# Patient Record
Sex: FEMALE | Race: WHITE | NOT HISPANIC OR LATINO | Employment: OTHER | URBAN - METROPOLITAN AREA
[De-identification: names, ages, dates, MRNs, and addresses within clinical notes are randomized per-mention and may not be internally consistent; named-entity substitution may affect disease eponyms.]

---

## 2017-04-14 ENCOUNTER — OFFICE VISIT (OUTPATIENT)
Dept: ENDOCRINOLOGY | Facility: CLINIC | Age: 32
End: 2017-04-14

## 2017-04-14 VITALS
DIASTOLIC BLOOD PRESSURE: 74 MMHG | HEIGHT: 67 IN | SYSTOLIC BLOOD PRESSURE: 120 MMHG | BODY MASS INDEX: 25.55 KG/M2 | HEART RATE: 68 BPM | OXYGEN SATURATION: 98 % | WEIGHT: 162.8 LBS

## 2017-04-14 DIAGNOSIS — E05.90 HYPERTHYROIDISM: Primary | ICD-10-CM

## 2017-04-14 LAB
25(OH)D3 SERPL-MCNC: 47 NG/ML
T4 FREE SERPL-MCNC: 1.15 NG/DL (ref 0.89–1.76)
TSH SERPL DL<=0.05 MIU/L-ACNC: 1.96 MIU/ML (ref 0.35–5.35)

## 2017-04-14 PROCEDURE — 82306 VITAMIN D 25 HYDROXY: CPT | Performed by: INTERNAL MEDICINE

## 2017-04-14 PROCEDURE — 84439 ASSAY OF FREE THYROXINE: CPT | Performed by: INTERNAL MEDICINE

## 2017-04-14 PROCEDURE — 84443 ASSAY THYROID STIM HORMONE: CPT | Performed by: INTERNAL MEDICINE

## 2017-04-14 PROCEDURE — 99213 OFFICE O/P EST LOW 20 MIN: CPT | Performed by: INTERNAL MEDICINE

## 2017-04-14 RX ORDER — ERGOCALCIFEROL 1.25 MG/1
50000 CAPSULE ORAL WEEKLY
Qty: 5 CAPSULE | Refills: 11 | Status: SHIPPED | OUTPATIENT
Start: 2017-04-14 | End: 2017-05-31 | Stop reason: SDUPTHER

## 2017-04-14 NOTE — PROGRESS NOTES
"Chief complaint  Hyperthyroidism (F/u for hyperthyroidism, C/o feeling tired but stated that she thinks it may be from forgetting her Vitamin D last week. )    Subjective   Jazlny Mota is a 31 y.o. female is here today for follow-up.  Hyperthyroidism: The patient is being seen for follow-up of hyperthyroidism. Disease type: undetermined and sx started in august, progressively getting worse. Current treatment includes propylthiouracil and started Oct 2015, no side effets or reactions.   She has 2 children, doesn't use birth control. Uses family planning method.   Mother has a history of Graves, treated with ACRRERA. She had multiple symptoms and problems with management of thyroid disease after I-131 therapy.   Palpitation and insomnia improved. Headaches resolved. Still feels tired.   Sx improved after Vit D start and feels when skips the dose tiredness worsen.   Hyperthyroidism       12/2016 Vit d was low and I have started 50 000 IU weekly Vit D  Medications    Current Outpatient Prescriptions:   •  propylthiouracil (PTU) 50 MG tablet, Take 1 tablet by mouth 3 (Three) Times a Day., Disp: 90 tablet, Rfl: 6  •  vitamin D (ERGOCALCIFEROL) 07318 UNITS capsule capsule, Take 1 capsule by mouth 1 (One) Time Per Week., Disp: 15 capsule, Rfl: 1    PMH  The following portions of the patient's history were reviewed and updated as appropriate: allergies, current medications, past family history, past medical history, past social history, past surgical history and problem list.    Review of systems  Review of Systems   Cardiovascular: Positive for palpitations.   Endocrine: Positive for heat intolerance.        Increased sweating   Genitourinary: Positive for menstrual problem.   Psychiatric/Behavioral: Positive for decreased concentration.   All other systems reviewed and are negative.      Physical exam  Objective   Blood pressure 120/74, pulse 68, height 67\" (170.2 cm), weight 162 lb 12.8 oz (73.8 kg), SpO2 98 %.   Physical " Exam   Constitutional: She is oriented to person, place, and time. She appears well-developed and well-nourished.   HENT:   Head: Normocephalic and atraumatic.   Eyes: Conjunctivae and EOM are normal.   Neck: Normal range of motion. Neck supple. Thyromegaly (right lobe is prominent, no nodules) present.   The neck is supple and free of adenopathy or masses, the thyroid is normal without enlargement or nodules.   Cardiovascular: Normal rate, regular rhythm and normal heart sounds.    Pulmonary/Chest: Effort normal and breath sounds normal.   Musculoskeletal: Normal range of motion. She exhibits no edema, tenderness or deformity.   Lymphadenopathy:     She has no cervical adenopathy.   Neurological: She is alert and oriented to person, place, and time.   No tremors   Skin: Skin is warm and dry. No rash noted.   Psychiatric: She has a normal mood and affect.   Nursing note and vitals reviewed.        LABS AND IMAGING  No visits with results within 1 Month(s) from this visit.  Latest known visit with results is:    Office Visit on 12/14/2016   Component Date Value Ref Range Status   • TSH 12/14/2016 1.643  0.350 - 5.350 mIU/mL Final   • Free T4 12/14/2016 1.11  0.89 - 1.76 ng/dL Final   • Glucose 12/14/2016 84  70 - 100 mg/dL Final   • BUN 12/14/2016 11  9 - 23 mg/dL Final   • Creatinine 12/14/2016 0.80  0.60 - 1.30 mg/dL Final   • Sodium 12/14/2016 141  132 - 146 mmol/L Final   • Potassium 12/14/2016 4.2  3.5 - 5.5 mmol/L Final   • Chloride 12/14/2016 107  99 - 109 mmol/L Final   • CO2 12/14/2016 23.0  20.0 - 31.0 mmol/L Final   • Calcium 12/14/2016 9.8  8.7 - 10.4 mg/dL Final   • Total Protein 12/14/2016 7.6  5.7 - 8.2 g/dL Final   • Albumin 12/14/2016 4.40  3.20 - 4.80 g/dL Final   • ALT (SGPT) 12/14/2016 18  7 - 40 U/L Final   • AST (SGOT) 12/14/2016 19  0 - 33 U/L Final   • Alkaline Phosphatase 12/14/2016 86  25 - 100 U/L Final   • Total Bilirubin 12/14/2016 0.4  0.3 - 1.2 mg/dL Final   • eGFR Non African Amer  12/14/2016 83  >60 mL/min/1.73 Final   • Globulin 12/14/2016 3.2  gm/dL Final   • A/G Ratio 12/14/2016 1.4  g/dL Final   • BUN/Creatinine Ratio 12/14/2016 13.8  7.0 - 25.0 Final   • Anion Gap 12/14/2016 11.0  3.0 - 11.0 mmol/L Final   • Iron 12/14/2016 76  50 - 175 mcg/dL Final   • 25 Hydroxy, Vitamin D 12/14/2016 15.4  ng/ml Final   • WBC 12/14/2016 6.62  3.50 - 10.80 10*3/mm3 Final   • RBC 12/14/2016 4.28  3.89 - 5.14 10*6/mm3 Final   • Hemoglobin 12/14/2016 12.9  11.5 - 15.5 g/dL Final   • Hematocrit 12/14/2016 38.5  34.5 - 44.0 % Final   • MCV 12/14/2016 90.0  80.0 - 99.0 fL Final   • MCH 12/14/2016 30.1  27.0 - 31.0 pg Final   • MCHC 12/14/2016 33.5  32.0 - 36.0 g/dL Final   • RDW 12/14/2016 13.3  11.3 - 14.5 % Final   • RDW-SD 12/14/2016 43.4  37.0 - 54.0 fl Final   • MPV 12/14/2016 10.6  6.0 - 12.0 fL Final   • Platelets 12/14/2016 286  150 - 450 10*3/mm3 Final   • Neutrophil % 12/14/2016 62.6  41.0 - 71.0 % Final   • Lymphocyte % 12/14/2016 29.3  24.0 - 44.0 % Final   • Monocyte % 12/14/2016 6.0  0.0 - 12.0 % Final   • Eosinophil % 12/14/2016 1.4  0.0 - 3.0 % Final   • Basophil % 12/14/2016 0.5  0.0 - 1.0 % Final   • Immature Grans % 12/14/2016 0.2  0.0 - 0.6 % Final   • Neutrophils, Absolute 12/14/2016 4.15  1.50 - 8.30 10*3/mm3 Final   • Lymphocytes, Absolute 12/14/2016 1.94  0.60 - 4.80 10*3/mm3 Final   • Monocytes, Absolute 12/14/2016 0.40  0.00 - 1.00 10*3/mm3 Final   • Eosinophils, Absolute 12/14/2016 0.09* 0.10 - 0.30 10*3/mm3 Final   • Basophils, Absolute 12/14/2016 0.03  0.00 - 0.20 10*3/mm3 Final   • Immature Grans, Absolute 12/14/2016 0.01  0.00 - 0.03 10*3/mm3 Final           Assessment  Problem List Items Addressed This Visit        Endocrine    Hyperthyroidism - Primary      Plan  Continue PTU 75 mg BID>  Repeat labs for dose adjustments.may reduce the dose to 1 tab BID if labs allow.     Follow-up in 4 months.

## 2017-04-18 ENCOUNTER — TELEPHONE (OUTPATIENT)
Dept: ENDOCRINOLOGY | Facility: CLINIC | Age: 32
End: 2017-04-18

## 2017-04-18 DIAGNOSIS — E05.90 HYPERTHYROIDISM: Primary | ICD-10-CM

## 2017-04-18 NOTE — TELEPHONE ENCOUNTER
----- Message from Evelyn CHESTER MD sent at 4/17/2017 12:01 PM EDT -----  Please call the patient regarding her lab results. THyroid function is normal, you may try to reduce PTU dose to 50 mg twice a day (1 tab twice a day) with repeat labs i n 1 -2 months. Vitamin D is normal. Place the orders for free T4 and TSH and send to patient if she doesn't have orders for repeat TFT

## 2017-04-18 NOTE — TELEPHONE ENCOUNTER
----- Message from Bertha Lopez sent at 4/18/2017 11:05 AM EDT -----  Contact: PATIENT   PATIENT RETURNED YOUR CALL

## 2017-06-01 RX ORDER — ERGOCALCIFEROL 1.25 MG/1
50000 CAPSULE ORAL WEEKLY
Qty: 5 CAPSULE | Refills: 11 | Status: SHIPPED | OUTPATIENT
Start: 2017-06-01 | End: 2017-12-01 | Stop reason: SDUPTHER

## 2017-07-10 ENCOUNTER — TELEPHONE (OUTPATIENT)
Dept: INTERNAL MEDICINE | Facility: CLINIC | Age: 32
End: 2017-07-10

## 2017-07-10 NOTE — TELEPHONE ENCOUNTER
Spoke to patient. Have asked her to hold the PTU until Friday (07/14/2017). Then restart at 50 mg daily. Pt to call me on Monday 7/17/2017 to let me know if rash resolves off med or recurs.   Rosa M Loco MD

## 2017-07-10 NOTE — TELEPHONE ENCOUNTER
C/o itching, she thinks this may be a side effect of a medication (PTU) she has been taking. She has been having this itching for about 1 month. Will have recent lab results faxed to this office.    Call back #[phone]

## 2017-07-14 ENCOUNTER — TELEPHONE (OUTPATIENT)
Dept: ENDOCRINOLOGY | Facility: CLINIC | Age: 32
End: 2017-07-14

## 2017-07-14 NOTE — TELEPHONE ENCOUNTER
----- Message from Evelyn CHESTER MD sent at 7/14/2017  8:03 AM EDT -----  PLease call with results of the labs: THyroid function was normal June 14 after dose reduction.  PLease ask her if she still has rash. She supposed to restart the medication at the lower dose of 50 mg daily today and have repeat labs.

## 2017-07-18 NOTE — TELEPHONE ENCOUNTER
Pt was informed of results and expressed understanding regarding her Rx. Lab order was entered into pt's chart and mailed to pt per pt request.   No

## 2017-07-19 ENCOUNTER — TELEPHONE (OUTPATIENT)
Dept: INTERNAL MEDICINE | Facility: CLINIC | Age: 32
End: 2017-07-19

## 2017-07-19 NOTE — TELEPHONE ENCOUNTER
Spoke with pt and she said that her itching has resolved once she starting taking the PTU once daily. Per Dr. Beal, pt was instructed to continue the PTU once daily and then we can recheck her levels in a couple of weeks.

## 2017-07-19 NOTE — TELEPHONE ENCOUNTER
PT CALLED IN REGARDS TO HER ITCHING. SHE STATES THAT A DOCTOR ON CALL FROM OUR OFFICE CALLED HER AND WANTED TO KNOW IF IT WAS THE MEDICATION THAT MADE HER ITCH. THE PATIENT WOULD LIKE FOR YOU TO GIVE HER A CALL BACK IN REGARDS TO THIS       PT CALL BACK NUMBER [phone]

## 2017-08-18 ENCOUNTER — OFFICE VISIT (OUTPATIENT)
Dept: ENDOCRINOLOGY | Facility: CLINIC | Age: 32
End: 2017-08-18

## 2017-08-18 ENCOUNTER — TELEPHONE (OUTPATIENT)
Dept: INTERNAL MEDICINE | Facility: CLINIC | Age: 32
End: 2017-08-18

## 2017-08-18 VITALS
BODY MASS INDEX: 26.06 KG/M2 | HEIGHT: 67 IN | HEART RATE: 78 BPM | WEIGHT: 166 LBS | OXYGEN SATURATION: 98 % | DIASTOLIC BLOOD PRESSURE: 80 MMHG | SYSTOLIC BLOOD PRESSURE: 118 MMHG

## 2017-08-18 DIAGNOSIS — E05.90 HYPERTHYROIDISM: Primary | ICD-10-CM

## 2017-08-18 DIAGNOSIS — E55.9 VITAMIN D DEFICIENCY: ICD-10-CM

## 2017-08-18 LAB
25(OH)D3 SERPL-MCNC: 43.5 NG/ML
T4 FREE SERPL-MCNC: 1.2 NG/DL (ref 0.89–1.76)
TSH SERPL DL<=0.05 MIU/L-ACNC: 1.43 MIU/ML (ref 0.35–5.35)

## 2017-08-18 PROCEDURE — 99213 OFFICE O/P EST LOW 20 MIN: CPT | Performed by: INTERNAL MEDICINE

## 2017-08-18 PROCEDURE — 84443 ASSAY THYROID STIM HORMONE: CPT | Performed by: INTERNAL MEDICINE

## 2017-08-18 PROCEDURE — 82306 VITAMIN D 25 HYDROXY: CPT | Performed by: INTERNAL MEDICINE

## 2017-08-18 PROCEDURE — 84439 ASSAY OF FREE THYROXINE: CPT | Performed by: INTERNAL MEDICINE

## 2017-08-18 NOTE — PROGRESS NOTES
Chief complaint  Hyperthyroidism (F/u for hyperthyroidism, C/o feeling tired ~ has not taken vitamin d this month. )    Subjective   Jazlyn Mota is a 32 y.o. female is here today for follow-up.  Hyperthyroidism: sx started in august, progressively getting worse. Current treatment includes propylthiouracil since Oct 2015, no side effets or reactions.    She has 2 children, doesn't use birth control. Uses family planning method.   Mother has a history of Graves, treated with CARRERA. She had multiple symptoms and problems with management of thyroid disease after I-131 therapy.   Palpitation and insomnia improved. Headaches resolved. Still feels tired.   Sx improved after Vit D start and feels when skips the dose tiredness worsen.   July 2017 patient had reaction to PTU -itching and rash. SHe described it as deep rash- lasted for about a month. She stopped PTU, felt awful, then restarted later at a lower dose to 50 mg daily and feels better.     She c/o fatigue, but didn't take Vit D for the last month. Rash and itching resolved after dose reduction. She feels that medication effect wears off and she has palpitations in the morning, occasional palpitations during the day.    Hyperthyroidism       12/2016 Vit d was low and I have started 50 000 IU weekly Vit D  Medications    Current Outpatient Prescriptions:   •  propylthiouracil (PTU) 50 MG tablet, Take 1 tablet by mouth 3 (Three) Times a Day., Disp: 90 tablet, Rfl: 6  •  vitamin D (ERGOCALCIFEROL) 99852 UNITS capsule capsule, Take 1 capsule by mouth 1 (One) Time Per Week., Disp: 5 capsule, Rfl: 11    PMH  The following portions of the patient's history were reviewed and updated as appropriate: allergies, current medications, past family history, past medical history, past social history, past surgical history and problem list.    Review of systems  Review of Systems   Cardiovascular: Positive for palpitations.   Endocrine: Positive for heat intolerance.         "Increased sweating   Genitourinary: Positive for menstrual problem.   Psychiatric/Behavioral: Positive for decreased concentration.   All other systems reviewed and are negative.      Physical exam  Objective   Blood pressure 118/80, pulse 78, height 67\" (170.2 cm), weight 166 lb (75.3 kg), SpO2 98 %.   Physical Exam   Constitutional: She is oriented to person, place, and time. She appears well-developed and well-nourished.   HENT:   Head: Normocephalic and atraumatic.   Eyes: Conjunctivae and EOM are normal.   Neck: Normal range of motion. Neck supple. Thyromegaly (right lobe is prominent, no nodules) present.   The neck is supple and free of adenopathy or masses, the thyroid is normal without enlargement or nodules.   Cardiovascular: Normal rate, regular rhythm and normal heart sounds.    Pulmonary/Chest: Effort normal and breath sounds normal.   Musculoskeletal: Normal range of motion. She exhibits no edema, tenderness or deformity.   Lymphadenopathy:     She has no cervical adenopathy.   Neurological: She is alert and oriented to person, place, and time.   No tremors   Skin: Skin is warm and dry. No rash noted.   Psychiatric: She has a normal mood and affect.   Nursing note and vitals reviewed.        LABS AND IMAGING  No visits with results within 1 Month(s) from this visit.  Latest known visit with results is:    Office Visit on 04/14/2017   Component Date Value Ref Range Status   • TSH 04/14/2017 1.964  0.350 - 5.350 mIU/mL Final   • Free T4 04/14/2017 1.15  0.89 - 1.76 ng/dL Final   • 25 Hydroxy, Vitamin D 04/14/2017 47.0  ng/ml Final           Assessment  Problem List Items Addressed This Visit        Digestive    Vitamin D deficiency       Endocrine    Hyperthyroidism - Primary      Plan  Continue PTU 25 mg BID >  Repeat labs for dose adjustments.may reduce the dose further  Cont Vit D     Follow-up in 4 months.   "

## 2017-08-18 NOTE — TELEPHONE ENCOUNTER
MELINDA,    THIS PATIENT NEEDS TO SCHEDULE A 3 MONTH FOLLOW UP AROUND 11/18/2017 PER ALONSO. NO EARLY APPTS AS PATIENT LIVES 2 HOURS FROM OFFICE.

## 2017-08-30 ENCOUNTER — TELEPHONE (OUTPATIENT)
Dept: ENDOCRINOLOGY | Facility: CLINIC | Age: 32
End: 2017-08-30

## 2017-08-30 NOTE — TELEPHONE ENCOUNTER
----- Message from Evelyn CHESTER MD sent at 8/29/2017 10:49 PM EDT -----  Please call the patient regarding her lab results. Thyroid function and vit D are normal. Cont same dose medication

## 2017-12-01 ENCOUNTER — OFFICE VISIT (OUTPATIENT)
Dept: ENDOCRINOLOGY | Facility: CLINIC | Age: 32
End: 2017-12-01

## 2017-12-01 VITALS
HEIGHT: 67 IN | BODY MASS INDEX: 25.83 KG/M2 | WEIGHT: 164.6 LBS | OXYGEN SATURATION: 98 % | SYSTOLIC BLOOD PRESSURE: 116 MMHG | HEART RATE: 68 BPM | DIASTOLIC BLOOD PRESSURE: 80 MMHG

## 2017-12-01 DIAGNOSIS — E05.90 HYPERTHYROIDISM: Primary | ICD-10-CM

## 2017-12-01 DIAGNOSIS — E55.9 VITAMIN D DEFICIENCY: ICD-10-CM

## 2017-12-01 LAB
T4 FREE SERPL-MCNC: 1.32 NG/DL (ref 0.89–1.76)
TSH SERPL DL<=0.05 MIU/L-ACNC: 1.18 MIU/ML (ref 0.35–5.35)

## 2017-12-01 PROCEDURE — 90471 IMMUNIZATION ADMIN: CPT | Performed by: INTERNAL MEDICINE

## 2017-12-01 PROCEDURE — 99213 OFFICE O/P EST LOW 20 MIN: CPT | Performed by: INTERNAL MEDICINE

## 2017-12-01 PROCEDURE — 84439 ASSAY OF FREE THYROXINE: CPT | Performed by: INTERNAL MEDICINE

## 2017-12-01 PROCEDURE — 90686 IIV4 VACC NO PRSV 0.5 ML IM: CPT | Performed by: INTERNAL MEDICINE

## 2017-12-01 PROCEDURE — 84443 ASSAY THYROID STIM HORMONE: CPT | Performed by: INTERNAL MEDICINE

## 2017-12-01 RX ORDER — ERGOCALCIFEROL 1.25 MG/1
50000 CAPSULE ORAL WEEKLY
Qty: 5 CAPSULE | Refills: 5 | Status: SHIPPED | OUTPATIENT
Start: 2017-12-01 | End: 2018-08-16 | Stop reason: SDUPTHER

## 2017-12-01 NOTE — PROGRESS NOTES
Chief complaint  Hyperthyroidism (F/u for hyperthyroidism, no recent labs.)    Subjective   Jazlyn Mota is a 32 y.o. female is here today for follow-up.  Hyperthyroidism: sx started in august, progressively getting worse. Current treatment includes propylthiouracil since Oct 2015, no side effets or reactions.    She has 2 children, doesn't use birth control. Uses family planning method.   Mother has a history of Graves, treated with CARRERA. She had multiple symptoms and problems with management of thyroid disease after I-131 therapy.   Palpitation and insomnia improved. Headaches resolved. Still feels tired.   Sx improved after Vit D start and feels when skips the dose tiredness worsen.   July 2017 patient had reaction to PTU -itching and rash. SHe described it as deep rash- lasted for about a month. She stopped PTU, felt awful, then restarted later at a lower dose to 50 mg daily and feels better.         C/o fatigue and irritability. NO palpitations. Takes Vit D once a week.     Hyperthyroidism       12/2016 Vit d was low and I have started 50 000 IU weekly Vit D  Medications    Current Outpatient Prescriptions:   •  propylthiouracil (PTU) 50 MG tablet, Take 1 tablet by mouth 3 (Three) Times a Day., Disp: 90 tablet, Rfl: 6  •  vitamin D (ERGOCALCIFEROL) 18518 units capsule capsule, Take 1 capsule by mouth 1 (One) Time Per Week., Disp: 5 capsule, Rfl: 5    PMH  The following portions of the patient's history were reviewed and updated as appropriate: allergies, current medications, past family history, past medical history, past social history, past surgical history and problem list.    Review of systems  Review of Systems   Cardiovascular: Positive for palpitations.   Endocrine: Positive for heat intolerance.        Increased sweating   Genitourinary: Positive for menstrual problem.   Psychiatric/Behavioral: Positive for decreased concentration.   All other systems reviewed and are negative.      Physical  "exam  Objective   Blood pressure 116/80, pulse 68, height 67\" (170.2 cm), weight 164 lb 9.6 oz (74.7 kg), SpO2 98 %.   Physical Exam   Constitutional: She is oriented to person, place, and time. She appears well-developed and well-nourished.   HENT:   Head: Normocephalic and atraumatic.   Eyes: Conjunctivae and EOM are normal.   Neck: Normal range of motion. Neck supple. Thyromegaly (right lobe is prominent, no nodules) present.   The neck is supple and free of adenopathy or masses, the thyroid is normal without enlargement or nodules.   Cardiovascular: Normal rate, regular rhythm and normal heart sounds.    Pulmonary/Chest: Effort normal and breath sounds normal.   Musculoskeletal: Normal range of motion. She exhibits no edema, tenderness or deformity.   Lymphadenopathy:     She has no cervical adenopathy.   Neurological: She is alert and oriented to person, place, and time.   No tremors   Skin: Skin is warm and dry. No rash noted.   Psychiatric: She has a normal mood and affect.   Nursing note and vitals reviewed.        LABS AND IMAGING  No visits with results within 1 Month(s) from this visit.  Latest known visit with results is:    Office Visit on 08/18/2017   Component Date Value Ref Range Status   • TSH 08/18/2017 1.429  0.350 - 5.350 mIU/mL Final   • Free T4 08/18/2017 1.20  0.89 - 1.76 ng/dL Final   • 25 Hydroxy, Vitamin D 08/18/2017 43.5  ng/ml Final           Assessment  Problem List Items Addressed This Visit        Digestive    Vitamin D deficiency       Endocrine    Hyperthyroidism - Primary      Plan  Continue PTU 25 mg BID >  Repeat labs for dose adjustments.may reduce the dose further  Cont Vit D once a week.     Follow-up in 4 months.   "

## 2018-01-10 DIAGNOSIS — E05.90 HYPERTHYROIDISM: ICD-10-CM

## 2018-01-10 RX ORDER — PROPYLTHIOURACIL 50 MG/1
TABLET ORAL
Qty: 90 TABLET | Refills: 4 | Status: SHIPPED | OUTPATIENT
Start: 2018-01-10 | End: 2018-01-10 | Stop reason: SDUPTHER

## 2018-01-10 RX ORDER — PROPYLTHIOURACIL 50 MG/1
50 TABLET ORAL 3 TIMES DAILY
Qty: 90 TABLET | Refills: 4 | Status: SHIPPED | OUTPATIENT
Start: 2018-01-10 | End: 2018-08-16 | Stop reason: SDUPTHER

## 2018-04-19 ENCOUNTER — OFFICE VISIT (OUTPATIENT)
Dept: ENDOCRINOLOGY | Facility: CLINIC | Age: 33
End: 2018-04-19

## 2018-04-19 VITALS
HEART RATE: 72 BPM | DIASTOLIC BLOOD PRESSURE: 72 MMHG | BODY MASS INDEX: 26.43 KG/M2 | OXYGEN SATURATION: 98 % | HEIGHT: 67 IN | SYSTOLIC BLOOD PRESSURE: 118 MMHG | WEIGHT: 168.4 LBS

## 2018-04-19 DIAGNOSIS — E55.9 VITAMIN D DEFICIENCY: Primary | ICD-10-CM

## 2018-04-19 DIAGNOSIS — E05.90 HYPERTHYROIDISM: ICD-10-CM

## 2018-04-19 LAB
25(OH)D3 SERPL-MCNC: 53 NG/ML
T4 FREE SERPL-MCNC: 1.19 NG/DL (ref 0.89–1.76)
TSH SERPL DL<=0.05 MIU/L-ACNC: 1.51 MIU/ML (ref 0.35–5.35)

## 2018-04-19 PROCEDURE — 82306 VITAMIN D 25 HYDROXY: CPT | Performed by: INTERNAL MEDICINE

## 2018-04-19 PROCEDURE — 84439 ASSAY OF FREE THYROXINE: CPT | Performed by: INTERNAL MEDICINE

## 2018-04-19 PROCEDURE — 99213 OFFICE O/P EST LOW 20 MIN: CPT | Performed by: INTERNAL MEDICINE

## 2018-04-19 PROCEDURE — 84443 ASSAY THYROID STIM HORMONE: CPT | Performed by: INTERNAL MEDICINE

## 2018-04-19 NOTE — PROGRESS NOTES
Chief complaint  Hyperthyroidism (F/u for hyperthyroidism, pt stated she has been feeling well overall. No recent labs)    Subjective   Jazlyn Mota is a 32 y.o. female is here today for follow-up.  Hyperthyroidism: sx started in august, progressively getting worse. Current treatment includes propylthiouracil since Oct 2015, no side effets or reactions.    She has 2 children, doesn't use birth control. Uses family planning method.   Mother has a history of Graves, treated with CARRERA. She had multiple symptoms and problems with management of thyroid disease after I-131 therapy.   Palpitation and insomnia improved. Headaches resolved. Still feels tired.   Sx improved after Vit D start and feels when skips the dose tiredness worsen.   July 2017 patient had reaction to PTU -itching and rash. SHe described it as deep rash- lasted for about a month. She stopped PTU, felt awful, then restarted later at a lower dose to 50 mg daily and feels better.   Currently takes 25 mg BID.     C/o fatigue and irritability. NO palpitations. Takes Vit D once a week.     Hyperthyroidism       12/2016 Vit d was low and I have started 50 000 IU weekly Vit D  Medications    Current Outpatient Prescriptions:   •  propylthiouracil (PTU) 50 MG tablet, Take 1 tablet by mouth 3 (Three) Times a Day., Disp: 90 tablet, Rfl: 4  •  vitamin D (ERGOCALCIFEROL) 94525 units capsule capsule, Take 1 capsule by mouth 1 (One) Time Per Week., Disp: 5 capsule, Rfl: 5    PMH  The following portions of the patient's history were reviewed and updated as appropriate: allergies, current medications, past family history, past medical history, past social history, past surgical history and problem list.    Review of systems  Review of Systems   Cardiovascular: Negative for palpitations.   Endocrine: Positive for heat intolerance.        Increased sweating   Genitourinary: Positive for menstrual problem.   Psychiatric/Behavioral: Positive for decreased concentration.  "  All other systems reviewed and are negative.      Physical exam  Objective   Blood pressure 118/72, pulse 72, height 170.2 cm (67\"), weight 76.4 kg (168 lb 6.4 oz), SpO2 98 %.   Physical Exam   Constitutional: She is oriented to person, place, and time. She appears well-developed and well-nourished.   HENT:   Head: Normocephalic and atraumatic.   Eyes: Conjunctivae and EOM are normal.   Neck: Thyromegaly (right lobe is prominent, no nodules) present.   The neck is supple and free of adenopathy or masses, the thyroid is normal without enlargement or nodules.   Cardiovascular: Intact distal pulses.    Musculoskeletal: Normal range of motion. She exhibits no edema, tenderness or deformity.   Lymphadenopathy:     She has no cervical adenopathy.   Neurological: She is alert and oriented to person, place, and time.   No tremors   Skin: Skin is warm and dry. No rash noted.   Psychiatric: She has a normal mood and affect.   Nursing note and vitals reviewed.        LABS AND IMAGING  No visits with results within 1 Month(s) from this visit.   Latest known visit with results is:   Office Visit on 12/01/2017   Component Date Value Ref Range Status   • TSH 12/01/2017 1.182  0.350 - 5.350 mIU/mL Final   • Free T4 12/01/2017 1.32  0.89 - 1.76 ng/dL Final           Assessment  Problem List Items Addressed This Visit        Digestive    Vitamin D deficiency - Primary       Endocrine    Hyperthyroidism      Other Visit Diagnoses    None.     Plan  Continue PTU 25 mg BID >  Repeat labs for dose adjustments.may reduce the dose further  Cont Vit D once a week and I will make recommendations on the dose change after labs back.     Follow-up in 4 months.   "

## 2018-04-30 ENCOUNTER — TELEPHONE (OUTPATIENT)
Dept: ENDOCRINOLOGY | Facility: CLINIC | Age: 33
End: 2018-04-30

## 2018-04-30 NOTE — TELEPHONE ENCOUNTER
----- Message from Evelyn CHESTER MD sent at 4/26/2018  9:49 AM EDT -----  Please call the patient regarding her lab results. Vit D is normal - reduce supplements to every other week.   Thyroid function is normal and we could try to decrease PTU dose to 25 mg daily with repeat labs in 4 weeks. She can decrease the dose now, and then please mail her orders for TSH and free T4 in 4 weeks (along with lab letter), or she can continue 25 twice a day and in July, a month before her visit, decrease the dose to 25 mg daily.

## 2018-05-01 ENCOUNTER — TELEPHONE (OUTPATIENT)
Dept: ENDOCRINOLOGY | Facility: CLINIC | Age: 33
End: 2018-05-01

## 2018-05-01 DIAGNOSIS — E05.90 HYPERTHYROIDISM: Primary | ICD-10-CM

## 2018-05-01 NOTE — TELEPHONE ENCOUNTER
Pt was informed of results and verbalized understanding. Lab orders have been printed and mailed to pt along with the results letter.

## 2018-08-07 ENCOUNTER — TELEPHONE (OUTPATIENT)
Dept: INTERNAL MEDICINE | Facility: CLINIC | Age: 33
End: 2018-08-07

## 2018-08-07 NOTE — TELEPHONE ENCOUNTER
PT CALLED TO LEAVE A MESSAGE FOR DR. GUPTA OR YANNA STUBBS. THE PT WAS TOLD TO COME DOWN FROM HER PROPYLTHIOURACIL MEDICATION BY A SMALL AMOUNT SO THAT SHE MAY HAVE BLOOD WORK DONE. THE PT HAS SLOWLY DROPPED HER DOSAGE BUT WAS WANTING TO SEE IF SHE COULD HAVE THE LAB WORK DONE CLOSER TO WHERE SHE LIVES SO THE PT DOES NOT HAVE TO TRAVEL AS FAR TO OUR CLINIC. PT LIVES IN [place], [place] SO WAS GOING TO HAVE THEM DONE AT LOCAL HOSPITAL. PT STATES SHE HAS DISCUSSED WITH DR. GUPTA ABOUT HAVING HER LABS DONE THROUGH THE HOSPITAL IN THE PAST WHICH WAS OK'D AT THE TIME SHE PREVIOUSLY SPOKE WITH DR. GUPTA ABOUT. BEST CALL BACK # [phone]

## 2018-08-16 ENCOUNTER — OFFICE VISIT (OUTPATIENT)
Dept: ENDOCRINOLOGY | Facility: CLINIC | Age: 33
End: 2018-08-16

## 2018-08-16 VITALS
WEIGHT: 169.4 LBS | BODY MASS INDEX: 26.59 KG/M2 | SYSTOLIC BLOOD PRESSURE: 112 MMHG | HEART RATE: 65 BPM | HEIGHT: 67 IN | OXYGEN SATURATION: 98 % | DIASTOLIC BLOOD PRESSURE: 64 MMHG

## 2018-08-16 DIAGNOSIS — E05.90 HYPERTHYROIDISM: Primary | ICD-10-CM

## 2018-08-16 DIAGNOSIS — E55.9 VITAMIN D DEFICIENCY: ICD-10-CM

## 2018-08-16 PROCEDURE — 99213 OFFICE O/P EST LOW 20 MIN: CPT | Performed by: INTERNAL MEDICINE

## 2018-08-16 RX ORDER — PROPYLTHIOURACIL 50 MG/1
25 TABLET ORAL 2 TIMES DAILY
Qty: 90 TABLET | Refills: 4
Start: 2018-08-16 | End: 2019-01-04

## 2018-08-16 RX ORDER — ERGOCALCIFEROL 1.25 MG/1
50000 CAPSULE ORAL WEEKLY
Qty: 5 CAPSULE | Refills: 11 | Status: SHIPPED | OUTPATIENT
Start: 2018-08-16 | End: 2018-12-04 | Stop reason: SDUPTHER

## 2018-08-16 NOTE — PROGRESS NOTES
Chief complaint  Hyperthyroidism (Follow UP   Recent Labs , felt Sx from doing lower dose. Is taking 1/2 pill in AM and PM ) and Vitamin D Deficiency    Subjective   Jazlyn Mota is a 33 y.o. female is here today for follow-up.  Hyperthyroidism: sx started in august, progressively getting worse. Current treatment includes propylthiouracil since Oct 2015, no side effets or reactions.    She has 2 children, doesn't use birth control. Uses family planning method.   Mother has a history of Graves, treated with CARRERA. She had multiple symptoms and problems with management of thyroid disease after I-131 therapy.   July 2017 patient had reaction to PTU -itching and rash. SHe described it as deep rash- lasted for about a month. She stopped PTU, felt awful, then restarted later at a lower dose to 50 mg daily and feels better.   Currently takes 25 mg BID. She has attempted to reduce the dose to 25 mg once daily few weeks ago and developed headaches, anxiety , palpitations and other symptoms. She came for labs and then returned to prior dose ot 25 BID> Feels better now.  Labs from 08/10 reviewed: TSH is 1.14 and free T4 is 1.36        Hyperthyroidism   Associated symptoms include headaches.     12/2016 Vit d was low and I have started 50 000 IU weekly Vit D  Medications    Current Outpatient Prescriptions:   •  propylthiouracil (PTU) 50 MG tablet, Take 0.5 tablets by mouth 2 (Two) Times a Day., Disp: 90 tablet, Rfl: 4  •  vitamin D (ERGOCALCIFEROL) 60701 units capsule capsule, Take 1 capsule by mouth 1 (One) Time Per Week., Disp: 5 capsule, Rfl: 11    PMH  The following portions of the patient's history were reviewed and updated as appropriate: allergies, current medications, past family history, past medical history, past social history, past surgical history and problem list.    Review of systems  Review of Systems   Cardiovascular: Negative for palpitations.   Endocrine: Positive for heat intolerance.        Increased  "sweating   Genitourinary: Positive for menstrual problem.   Neurological: Positive for headaches.   Psychiatric/Behavioral: Positive for decreased concentration.   All other systems reviewed and are negative.      Physical exam  Objective   Blood pressure 112/64, pulse 65, height 170.2 cm (67\"), weight 76.8 kg (169 lb 6.4 oz), SpO2 98 %.   Physical Exam   Constitutional: She is oriented to person, place, and time. She appears well-developed and well-nourished.   HENT:   Head: Normocephalic and atraumatic.   Eyes: Conjunctivae and EOM are normal.   Neck: Thyromegaly (right lobe is prominent, no nodules) present.   The neck is supple and free of adenopathy or masses, the thyroid is normal without enlargement or nodules.   Cardiovascular: Intact distal pulses.    Musculoskeletal: Normal range of motion. She exhibits no edema, tenderness or deformity.   Lymphadenopathy:     She has no cervical adenopathy.   Neurological: She is alert and oriented to person, place, and time.   No tremors   Skin: Skin is warm and dry. No rash noted.   Psychiatric: She has a normal mood and affect.   Nursing note and vitals reviewed.        LABS AND IMAGING  No visits with results within 1 Month(s) from this visit.   Latest known visit with results is:   Office Visit on 04/19/2018   Component Date Value Ref Range Status   • 25 Hydroxy, Vitamin D 04/19/2018 53.0  ng/ml Final   • TSH 04/19/2018 1.509  0.350 - 5.350 mIU/mL Final   • Free T4 04/19/2018 1.19  0.89 - 1.76 ng/dL Final           Assessment  Problem List Items Addressed This Visit        Digestive    Vitamin D deficiency       Endocrine    Hyperthyroidism - Primary    Relevant Medications    propylthiouracil (PTU) 50 MG tablet    Other Relevant Orders    TSH    T4, Free      Plan  Continue PTU 25 mg BID > reducing the dose triggered sx.   She has asked about family planning, PTU will have to be continued through the pregnancy.   Cont Vit D once a week in fall - winter  Follow-up in " 4 months.

## 2018-08-17 DIAGNOSIS — E05.90 HYPERTHYROIDISM: ICD-10-CM

## 2018-12-04 RX ORDER — ERGOCALCIFEROL 1.25 MG/1
CAPSULE ORAL
Qty: 5 CAPSULE | Refills: 0 | Status: SHIPPED | OUTPATIENT
Start: 2018-12-04 | End: 2019-05-17

## 2019-01-04 ENCOUNTER — OFFICE VISIT (OUTPATIENT)
Dept: ENDOCRINOLOGY | Facility: CLINIC | Age: 34
End: 2019-01-04

## 2019-01-04 VITALS
HEIGHT: 67 IN | WEIGHT: 172 LBS | SYSTOLIC BLOOD PRESSURE: 108 MMHG | HEART RATE: 71 BPM | BODY MASS INDEX: 27 KG/M2 | OXYGEN SATURATION: 99 % | DIASTOLIC BLOOD PRESSURE: 70 MMHG

## 2019-01-04 DIAGNOSIS — E05.90 HYPERTHYROIDISM: Primary | ICD-10-CM

## 2019-01-04 DIAGNOSIS — E55.9 VITAMIN D DEFICIENCY: ICD-10-CM

## 2019-01-04 LAB
25(OH)D3 SERPL-MCNC: 37.7 NG/ML
T4 FREE SERPL-MCNC: 1.44 NG/DL (ref 0.89–1.76)
TSH SERPL DL<=0.05 MIU/L-ACNC: 1.46 MIU/ML (ref 0.35–5.35)

## 2019-01-04 PROCEDURE — 82306 VITAMIN D 25 HYDROXY: CPT | Performed by: INTERNAL MEDICINE

## 2019-01-04 PROCEDURE — 90674 CCIIV4 VAC NO PRSV 0.5 ML IM: CPT | Performed by: INTERNAL MEDICINE

## 2019-01-04 PROCEDURE — 84443 ASSAY THYROID STIM HORMONE: CPT | Performed by: INTERNAL MEDICINE

## 2019-01-04 PROCEDURE — 84439 ASSAY OF FREE THYROXINE: CPT | Performed by: INTERNAL MEDICINE

## 2019-01-04 PROCEDURE — 99213 OFFICE O/P EST LOW 20 MIN: CPT | Performed by: INTERNAL MEDICINE

## 2019-01-04 PROCEDURE — 90471 IMMUNIZATION ADMIN: CPT | Performed by: INTERNAL MEDICINE

## 2019-01-04 RX ORDER — PROPYLTHIOURACIL 50 MG/1
25 TABLET ORAL 2 TIMES DAILY
Qty: 90 TABLET | Refills: 3 | Status: SHIPPED | OUTPATIENT
Start: 2019-01-04 | End: 2019-03-22 | Stop reason: SDUPTHER

## 2019-01-04 NOTE — PROGRESS NOTES
"Chief complaint  Hyperthyroidism (f/u )    Subjective   Jazlyn Mota is a 33 y.o. female is here today for follow-up.  Hyperthyroidism: on propylthiouracil since Oct 2015.   July 2017 patient had reaction to PTU - itching and rash.  She stopped PTU, felt awful, then restarted later at a lower dose of 50 mg daily.   Currently takes PTU 25 mg BID.     Vit D - takes weekly 50 000 IU.     C/o knee and hip pains. Asked for flu shot.     Medications    Current Outpatient Medications:   •  propylthiouracil (PTU) 50 MG tablet, Take 0.5 tablets by mouth 2 (Two) Times a Day., Disp: 90 tablet, Rfl: 3  •  vitamin D (ERGOCALCIFEROL) 15107 units capsule capsule, TAKE ONE CAPSULE BY MOUTH ONCE PER WEEK, Disp: 5 capsule, Rfl: 0    PMH  The following portions of the patient's history were reviewed and updated as appropriate: allergies, current medications, past family history, past medical history, past social history, past surgical history and problem list.  Mother has a history of Graves, treated with CARRERA. She had multiple symptoms and problems with management of thyroid disease after I-131 therapy.   Patient is considering family planning.    Review of systems  Review of Systems   Cardiovascular: Negative for palpitations.   Endocrine: Positive for heat intolerance.        Increased sweating   Genitourinary: Positive for menstrual problem.   Neurological: Positive for headaches.   Psychiatric/Behavioral: Positive for decreased concentration.   All other systems reviewed and are negative.      Physical exam  Objective   Blood pressure 108/70, pulse 71, height 170.2 cm (67\"), weight 78 kg (172 lb), SpO2 99 %.   Physical Exam   Constitutional: She is oriented to person, place, and time. She appears well-developed and well-nourished.   HENT:   Head: Normocephalic and atraumatic.   Eyes: Conjunctivae and EOM are normal.   Neck: Thyromegaly (right lobe is prominent, no nodules) present.   The neck is supple and free of adenopathy or " masses, the thyroid is normal without enlargement or nodules.   Cardiovascular: Intact distal pulses.   Musculoskeletal: Normal range of motion. She exhibits no edema, tenderness or deformity.   Lymphadenopathy:     She has no cervical adenopathy.   Neurological: She is alert and oriented to person, place, and time.   No tremors   Skin: Skin is warm and dry. No rash noted.   Psychiatric: She has a normal mood and affect.   Nursing note and vitals reviewed.        LABS AND IMAGING  No visits with results within 1 Month(s) from this visit.   Latest known visit with results is:   Office Visit on 04/19/2018   Component Date Value Ref Range Status   • 25 Hydroxy, Vitamin D 04/19/2018 53.0  ng/ml Final   • TSH 04/19/2018 1.509  0.350 - 5.350 mIU/mL Final   • Free T4 04/19/2018 1.19  0.89 - 1.76 ng/dL Final           Assessment  Problem List Items Addressed This Visit        Digestive    Vitamin D deficiency    Relevant Orders    Vitamin D 25 Hydroxy       Endocrine    Hyperthyroidism - Primary    Relevant Medications    propylthiouracil (PTU) 50 MG tablet    Other Relevant Orders    T4, Free    TSH      Plan  Continue PTU 25 mg BID > reducing the dose triggered sx of hyperthyroidism.   Cont Vit D once a week, repeat the level today.   Follow-up in 4 months.

## 2019-01-22 DIAGNOSIS — E05.90 HYPERTHYROIDISM: Primary | ICD-10-CM

## 2019-02-01 ENCOUNTER — TELEPHONE (OUTPATIENT)
Dept: INTERNAL MEDICINE | Facility: CLINIC | Age: 34
End: 2019-02-01

## 2019-02-01 NOTE — TELEPHONE ENCOUNTER
PT CALLED TO LET  KNOW THAT SHE JUST FOUND OUT THAT SHE WAS PREGNANT AND WANTS TO SEE IF SHE NEEDS TO ADJUST HER THYROID MEDICATION; PLEASE CALL AND ADVISE [phone]

## 2019-02-05 NOTE — TELEPHONE ENCOUNTER
Patient notified of message and dose adjustment.  Expressed understanding.  I will mail out Lab Orders

## 2019-02-05 NOTE — TELEPHONE ENCOUNTER
She could try reducing PTU dose to 1/2 tablet once a day and repeat labs in 6 weeks. (please send her orders for free t4 and TSH)

## 2019-03-04 ENCOUNTER — TELEPHONE (OUTPATIENT)
Dept: INTERNAL MEDICINE | Facility: CLINIC | Age: 34
End: 2019-03-04

## 2019-03-04 NOTE — TELEPHONE ENCOUNTER
Patient called to check to see if we had received the lab results from the other facility.  She said she is concerned that her thyroid is acting up and wants to know what to do.       Symptoms: pregnant, dose was lowered to half.  Heart racing, headache    Please call her back at [phone]

## 2019-03-06 ENCOUNTER — TELEPHONE (OUTPATIENT)
Dept: ENDOCRINOLOGY | Facility: CLINIC | Age: 34
End: 2019-03-06

## 2019-03-06 NOTE — TELEPHONE ENCOUNTER
PATIENT IS CALLING FOR RESULTS FROM THE LABS SHE HAD DONE ON 3/4/19. THE LABS HAVE BEEN SCANNED INTO THE COMPUTER. PT CAN BE REACHED [phone]

## 2019-03-07 NOTE — TELEPHONE ENCOUNTER
Spoke with patient she stated that she feels like it is not in normal range, but high.  Is taking 1/2 pill a day.  Sx: heart staying between 90 & 100.  head hurts behind her eye.  Just does not feel right at this dose.   Please advise, she is also pregnant

## 2019-03-08 DIAGNOSIS — E05.90 HYPERTHYROIDISM: Primary | ICD-10-CM

## 2019-03-08 NOTE — TELEPHONE ENCOUNTER
She can try going to full pill every other day for a week and see if sx improve. The levels do not suggest hyperthyroidism, but if she feels much better on full pill, ok to continue but need to repeat free t4 and TSH in 3-4 weeks (please order those if she doesn't have standing orders).   If no difference in sx I would prefer her to continue 1/2 tab daily. In pregnancy its best to go with the lowest dose possible.

## 2019-03-19 ENCOUNTER — TELEPHONE (OUTPATIENT)
Dept: INTERNAL MEDICINE | Facility: CLINIC | Age: 34
End: 2019-03-19

## 2019-03-19 NOTE — TELEPHONE ENCOUNTER
PATIENT CALLED TODAY TO FOLLOW UP ON FAX SENT FROM [facility] ON 3/15/2019 REGARDING HER THYROID LEVEL. HER OB PROVIDER IS WANTING HER THYROID MEDICATION CHANGED.    CALL BACK [phone]

## 2019-03-20 NOTE — TELEPHONE ENCOUNTER
I have reviewed note and lab results from 3/4/2019. Your thyroid function is normal and I would like to continue 1/2 tab of PTU daily. It is very small dose of medication but seem to help control your levels and symptoms. Your doctor recommended to switch to methimazole in the second trimester, but since you have not been taking methimazole for a while and any switch may result in the fluctuation in the level, my preference would be to stay with your current PTU 1/2 tab daily.

## 2019-03-21 NOTE — TELEPHONE ENCOUNTER
Spoke to patient and delivered message.   She asked that ochoa send the message to her OB/GYN [facility]   [phone]

## 2019-03-22 DIAGNOSIS — E05.90 HYPERTHYROIDISM: ICD-10-CM

## 2019-03-22 RX ORDER — PROPYLTHIOURACIL 50 MG/1
25 TABLET ORAL DAILY
Qty: 90 TABLET | Refills: 3 | Status: SHIPPED | OUTPATIENT
Start: 2019-03-22 | End: 2019-03-22 | Stop reason: SDUPTHER

## 2019-03-22 RX ORDER — PROPYLTHIOURACIL 50 MG/1
25 TABLET ORAL DAILY
Qty: 45 TABLET | Refills: 3 | Status: SHIPPED | OUTPATIENT
Start: 2019-03-22 | End: 2019-05-17

## 2019-03-22 RX ORDER — PROPYLTHIOURACIL 50 MG/1
TABLET ORAL
Qty: 90 TABLET | Refills: 4 | OUTPATIENT
Start: 2019-03-22

## 2019-04-18 ENCOUNTER — TELEPHONE (OUTPATIENT)
Dept: ENDOCRINOLOGY | Facility: CLINIC | Age: 34
End: 2019-04-18

## 2019-04-18 NOTE — TELEPHONE ENCOUNTER
----- Message from Evelyn CHESTER MD sent at 4/16/2019  2:43 PM EDT -----  Please call pt with lab results: thyroid function is normal. Cont same dose 1/2 tab daily PTU>

## 2019-05-17 ENCOUNTER — OFFICE VISIT (OUTPATIENT)
Dept: ENDOCRINOLOGY | Facility: CLINIC | Age: 34
End: 2019-05-17

## 2019-05-17 VITALS
WEIGHT: 173.9 LBS | DIASTOLIC BLOOD PRESSURE: 67 MMHG | HEART RATE: 75 BPM | HEIGHT: 67 IN | SYSTOLIC BLOOD PRESSURE: 110 MMHG | OXYGEN SATURATION: 99 % | BODY MASS INDEX: 27.29 KG/M2

## 2019-05-17 DIAGNOSIS — E55.9 VITAMIN D DEFICIENCY: Primary | ICD-10-CM

## 2019-05-17 DIAGNOSIS — Z34.90 PREGNANCY, UNSPECIFIED GESTATIONAL AGE: ICD-10-CM

## 2019-05-17 DIAGNOSIS — E05.90 HYPERTHYROIDISM: ICD-10-CM

## 2019-05-17 LAB
25(OH)D3 SERPL-MCNC: 23.4 NG/ML (ref 30–100)
BASOPHILS # BLD AUTO: 0.04 10*3/MM3 (ref 0–0.2)
BASOPHILS NFR BLD AUTO: 0.4 % (ref 0–1.5)
DEPRECATED RDW RBC AUTO: 51.4 FL (ref 37–54)
EOSINOPHIL # BLD AUTO: 0.04 10*3/MM3 (ref 0–0.4)
EOSINOPHIL NFR BLD AUTO: 0.4 % (ref 0.3–6.2)
ERYTHROCYTE [DISTWIDTH] IN BLOOD BY AUTOMATED COUNT: 14.3 % (ref 12.3–15.4)
HBA1C MFR BLD: 4.35 % (ref 4.8–5.6)
HCT VFR BLD AUTO: 35.2 % (ref 34–46.6)
HGB BLD-MCNC: 11.5 G/DL (ref 12–15.9)
IMM GRANULOCYTES # BLD AUTO: 0.17 10*3/MM3 (ref 0–0.05)
IMM GRANULOCYTES NFR BLD AUTO: 1.7 % (ref 0–0.5)
IRON 24H UR-MRATE: 91 MCG/DL (ref 37–145)
IRON SATN MFR SERPL: 23 % (ref 20–50)
LYMPHOCYTES # BLD AUTO: 1.73 10*3/MM3 (ref 0.7–3.1)
LYMPHOCYTES NFR BLD AUTO: 17.7 % (ref 19.6–45.3)
MCH RBC QN AUTO: 32.1 PG (ref 26.6–33)
MCHC RBC AUTO-ENTMCNC: 32.7 G/DL (ref 31.5–35.7)
MCV RBC AUTO: 98.3 FL (ref 79–97)
MONOCYTES # BLD AUTO: 0.43 10*3/MM3 (ref 0.1–0.9)
MONOCYTES NFR BLD AUTO: 4.4 % (ref 5–12)
NEUTROPHILS # BLD AUTO: 7.39 10*3/MM3 (ref 1.7–7)
NEUTROPHILS NFR BLD AUTO: 75.4 % (ref 42.7–76)
NRBC BLD AUTO-RTO: 0 /100 WBC (ref 0–0.2)
PLATELET # BLD AUTO: 263 10*3/MM3 (ref 140–450)
PMV BLD AUTO: 12.2 FL (ref 6–12)
RBC # BLD AUTO: 3.58 10*6/MM3 (ref 3.77–5.28)
T4 FREE SERPL-MCNC: 1.09 NG/DL (ref 0.93–1.7)
TIBC SERPL-MCNC: 404 MCG/DL (ref 298–536)
TRANSFERRIN SERPL-MCNC: 271 MG/DL (ref 200–360)
TSH SERPL DL<=0.05 MIU/L-ACNC: 1.33 MIU/ML (ref 0.27–4.2)
WBC NRBC COR # BLD: 9.8 10*3/MM3 (ref 3.4–10.8)

## 2019-05-17 PROCEDURE — 82306 VITAMIN D 25 HYDROXY: CPT | Performed by: INTERNAL MEDICINE

## 2019-05-17 PROCEDURE — 83036 HEMOGLOBIN GLYCOSYLATED A1C: CPT | Performed by: INTERNAL MEDICINE

## 2019-05-17 PROCEDURE — 84443 ASSAY THYROID STIM HORMONE: CPT | Performed by: INTERNAL MEDICINE

## 2019-05-17 PROCEDURE — 99213 OFFICE O/P EST LOW 20 MIN: CPT | Performed by: INTERNAL MEDICINE

## 2019-05-17 PROCEDURE — 85025 COMPLETE CBC W/AUTO DIFF WBC: CPT | Performed by: INTERNAL MEDICINE

## 2019-05-17 PROCEDURE — 83540 ASSAY OF IRON: CPT | Performed by: INTERNAL MEDICINE

## 2019-05-17 PROCEDURE — 84439 ASSAY OF FREE THYROXINE: CPT | Performed by: INTERNAL MEDICINE

## 2019-05-17 PROCEDURE — 84466 ASSAY OF TRANSFERRIN: CPT | Performed by: INTERNAL MEDICINE

## 2019-05-17 RX ORDER — ASPIRIN 81 MG/1
81 TABLET, CHEWABLE ORAL DAILY
COMMUNITY
End: 2020-05-29

## 2019-05-17 RX ORDER — PROPYLTHIOURACIL 50 MG/1
50 TABLET ORAL DAILY
Qty: 90 TABLET | Refills: 3 | Status: SHIPPED | OUTPATIENT
Start: 2019-05-17 | End: 2019-11-01

## 2019-05-17 NOTE — PROGRESS NOTES
"Chief complaint  Hyperthyroidism (Follow UP:  21 Weeks pregnant.  Can tell difference in lowering dose. Feels better on higher dose) and Vitamin D Deficiency    Subjective   Jazlyn Mota is a 34 y.o. female is here today for follow-up.  Hyperthyroidism: on propylthiouracil since Oct 2015.   July 2017 patient had reaction to PTU - itching and rash.  She stopped PTU, felt awful, then restarted later at a lower dose of 50 mg daily.   Currently takes PTU 25 mg daily since the onset of pregnancy,  Had a lot of sx in March, but now sx improved. Still feels fatigued.     She has gestational diabetes screen earlier, the baby is large for gestational age.     Vit D -she takes prenatal vitamins without iron.       Medications    Current Outpatient Medications:   •  aspirin 81 MG chewable tablet, Chew 81 mg Daily., Disp: , Rfl:   •  PRENATAL VIT [id number] PO, Take  by mouth., Disp: , Rfl:   •  propylthiouracil (PTU) 50 MG tablet, Take 1 tablet by mouth Daily., Disp: 90 tablet, Rfl: 3    PMH  The following portions of the patient's history were reviewed and updated as appropriate: allergies, current medications, past family history, past medical history, past social history, past surgical history and problem list.  Mother has a history of Graves, treated with CARRERA. She had multiple symptoms and problems with management of thyroid disease after I-131 therapy.       Review of systems  Review of Systems   Constitutional: Positive for fatigue.   Cardiovascular: Negative for palpitations.   Gastrointestinal: Positive for nausea.   Neurological: Positive for headaches.   Psychiatric/Behavioral: Positive for decreased concentration.   All other systems reviewed and are negative.      Physical exam  Objective   Blood pressure 110/67, pulse 75, height 170.2 cm (67\"), weight 78.9 kg (173 lb 14.4 oz), SpO2 99 %.   Physical Exam   Constitutional: She is oriented to person, place, and time. She appears well-developed and " well-nourished.   HENT:   Head: Normocephalic and atraumatic.   Eyes: Conjunctivae are normal.   Neck: Thyromegaly (right lobe is prominent, no nodules) present.   The neck is supple and free of adenopathy or masses, the thyroid is normal without enlargement or nodules.   Cardiovascular: Normal rate, regular rhythm and intact distal pulses.   Musculoskeletal: She exhibits no edema.   Lymphadenopathy:     She has no cervical adenopathy.   Neurological: She is alert and oriented to person, place, and time.   No tremors   Skin: Skin is warm and dry. No rash noted.   Psychiatric: She has a normal mood and affect.   Nursing note and vitals reviewed.        LABS AND IMAGING  No visits with results within 1 Month(s) from this visit.   Latest known visit with results is:   Office Visit on 01/04/2019   Component Date Value Ref Range Status   • Free T4 01/04/2019 1.44  0.89 - 1.76 ng/dL Final   • TSH 01/04/2019 1.462  0.350 - 5.350 mIU/mL Final   • 25 Hydroxy, Vitamin D 01/04/2019 37.7  ng/ml Final           Assessment  Problem List Items Addressed This Visit        Digestive    Vitamin D deficiency - Primary    Relevant Orders    Vitamin D 25 Hydroxy       Endocrine    Hyperthyroidism    Relevant Medications    propylthiouracil (PTU) 50 MG tablet    Other Relevant Orders    TSH    T4, Free    T4, Free    TSH      Other Visit Diagnoses     Pregnancy, unspecified gestational age        Relevant Orders    Iron Profile    Hemoglobin A1c    CBC Auto Differential      Plan  Continue PTU 25 mg daily. I have explained that Graves disease often exacerbated   Follow-up inin postpartum, will have to monitor the levels.    Labs today and I have added A1C, Vit D and iron in addition to thyroid.    Follow-up 6 weeks postpartum, repeat labs in the third trimester and early postpartum.

## 2019-07-02 ENCOUNTER — HOSPITAL ENCOUNTER (OUTPATIENT)
Dept: INFUSION THERAPY | Facility: HOSPITAL | Age: 34
Discharge: HOME | End: 2019-07-02
Attending: OBSTETRICS & GYNECOLOGY

## 2019-07-02 LAB
ABO GROUP BLD: NORMAL
BLD GP AB SCN SERPL QL: NORMAL
CONV ABD CONTROL: NORMAL
RH BLD: NORMAL

## 2019-09-03 ENCOUNTER — HOSPITAL ENCOUNTER (OUTPATIENT)
Dept: LABOR AND DELIVERY | Facility: HOSPITAL | Age: 34
Discharge: HOME | End: 2019-09-03
Attending: OBSTETRICS & GYNECOLOGY

## 2019-09-06 ENCOUNTER — HOSPITAL ENCOUNTER (OUTPATIENT)
Dept: LABOR AND DELIVERY | Facility: HOSPITAL | Age: 34
Discharge: HOME | End: 2019-09-06
Attending: OBSTETRICS & GYNECOLOGY

## 2019-09-10 ENCOUNTER — HOSPITAL ENCOUNTER (OUTPATIENT)
Dept: LABOR AND DELIVERY | Facility: HOSPITAL | Age: 34
Discharge: HOME | End: 2019-09-10
Attending: OBSTETRICS & GYNECOLOGY

## 2019-09-13 ENCOUNTER — HOSPITAL ENCOUNTER (OUTPATIENT)
Dept: LABOR AND DELIVERY | Facility: HOSPITAL | Age: 34
Discharge: HOME | End: 2019-09-13
Attending: OBSTETRICS & GYNECOLOGY

## 2019-09-17 ENCOUNTER — HOSPITAL ENCOUNTER (OUTPATIENT)
Dept: LABOR AND DELIVERY | Facility: HOSPITAL | Age: 34
Discharge: HOME | End: 2019-09-17
Attending: OBSTETRICS & GYNECOLOGY

## 2019-09-20 ENCOUNTER — HOSPITAL ENCOUNTER (OUTPATIENT)
Dept: LABOR AND DELIVERY | Facility: HOSPITAL | Age: 34
Discharge: HOME | End: 2019-09-20
Attending: OBSTETRICS & GYNECOLOGY

## 2019-11-01 ENCOUNTER — OFFICE VISIT (OUTPATIENT)
Dept: ENDOCRINOLOGY | Facility: CLINIC | Age: 34
End: 2019-11-01

## 2019-11-01 VITALS
HEART RATE: 76 BPM | DIASTOLIC BLOOD PRESSURE: 82 MMHG | HEIGHT: 67 IN | OXYGEN SATURATION: 99 % | SYSTOLIC BLOOD PRESSURE: 120 MMHG | WEIGHT: 183.4 LBS | BODY MASS INDEX: 28.79 KG/M2

## 2019-11-01 DIAGNOSIS — E05.90 HYPERTHYROIDISM: Primary | ICD-10-CM

## 2019-11-01 PROCEDURE — 84443 ASSAY THYROID STIM HORMONE: CPT | Performed by: INTERNAL MEDICINE

## 2019-11-01 PROCEDURE — 84439 ASSAY OF FREE THYROXINE: CPT | Performed by: INTERNAL MEDICINE

## 2019-11-01 PROCEDURE — 99213 OFFICE O/P EST LOW 20 MIN: CPT | Performed by: INTERNAL MEDICINE

## 2019-11-01 RX ORDER — PROPYLTHIOURACIL 50 MG/1
25 TABLET ORAL 2 TIMES DAILY
Qty: 30 TABLET | Refills: 6 | Status: SHIPPED | OUTPATIENT
Start: 2019-11-01 | End: 2020-05-29 | Stop reason: SDUPTHER

## 2019-11-01 NOTE — PROGRESS NOTES
"Chief complaint  Hyperthyroidism (Follow up - )    Subjective   Jazlyn Mota is a 34 y.o. female is here today for follow-up.  Hyperthyroidism: on propylthiouracil since Oct 2015.   July 2017 patient had reaction to PTU - itching and rash.  She stopped PTU, felt awful, then restarted later at a lower dose of 50 mg daily.   Currently takes PTU 25 mg daily since the onset of pregnancy. She delivered a healthy boy in 09/2019, no complications.   Doing well, no symptoms.      Medications    Current Outpatient Medications:   •  Cholecalciferol (VITAMIN D-1000 MAX ST) 25 MCG (1000 UT) tablet, Take 1,000 Units by mouth Daily., Disp: , Rfl:   •  PRENATAL VIT [id number] PO, Take  by mouth., Disp: , Rfl:   •  propylthiouracil (PTU) 50 MG tablet, Take 1 tablet by mouth Daily. (Patient taking differently: Take 25 mg by mouth Daily.), Disp: 90 tablet, Rfl: 3  •  aspirin 81 MG chewable tablet, Chew 81 mg Daily., Disp: , Rfl:     PMH  The following portions of the patient's history were reviewed and updated as appropriate: allergies, current medications, past family history, past medical history, past social history, past surgical history and problem list.  Mother has a history of Graves, treated with CARRERA. She had multiple symptoms and problems with management of thyroid disease after I-131 therapy.       Review of systems  Review of Systems   Constitutional: Positive for fatigue.   Cardiovascular: Negative for palpitations.   Psychiatric/Behavioral: Positive for decreased concentration.   All other systems reviewed and are negative.      Physical exam  Objective   Blood pressure 120/82, pulse 76, height 170.2 cm (67\"), weight 83.2 kg (183 lb 6.4 oz), SpO2 99 %, currently breastfeeding.   Physical Exam   Constitutional: She is oriented to person, place, and time. She appears well-developed and well-nourished.   HENT:   Head: Normocephalic and atraumatic.   Eyes: Conjunctivae are normal.   Neck: Thyromegaly (right lobe is " prominent, no nodules) present.   The neck is supple and free of adenopathy or masses, the thyroid is normal without enlargement or nodules.   Cardiovascular: Normal rate, regular rhythm and intact distal pulses.   Lymphadenopathy:     She has no cervical adenopathy.   Neurological: She is alert and oriented to person, place, and time.   No tremors   Psychiatric: She has a normal mood and affect.   Nursing note and vitals reviewed.        LABS AND IMAGING  No visits with results within 1 Month(s) from this visit.   Latest known visit with results is:   Office Visit on 05/17/2019   Component Date Value Ref Range Status   • Iron 05/17/2019 91  37 - 145 mcg/dL Final   • Iron Saturation 05/17/2019 23  20 - 50 % Final   • Transferrin 05/17/2019 271  200 - 360 mg/dL Final   • TIBC 05/17/2019 404  298 - 536 mcg/dL Final   • Hemoglobin A1C 05/17/2019 4.35* 4.80 - 5.60 % Final   • Free T4 05/17/2019 1.09  0.93 - 1.70 ng/dL Final   • TSH 05/17/2019 1.330  0.270 - 4.200 mIU/mL Final   • WBC 05/17/2019 9.80  3.40 - 10.80 10*3/mm3 Final   • RBC 05/17/2019 3.58* 3.77 - 5.28 10*6/mm3 Final   • Hemoglobin 05/17/2019 11.5* 12.0 - 15.9 g/dL Final   • Hematocrit 05/17/2019 35.2  34.0 - 46.6 % Final   • MCV 05/17/2019 98.3* 79.0 - 97.0 fL Final   • MCH 05/17/2019 32.1  26.6 - 33.0 pg Final   • MCHC 05/17/2019 32.7  31.5 - 35.7 g/dL Final   • RDW 05/17/2019 14.3  12.3 - 15.4 % Final   • RDW-SD 05/17/2019 51.4  37.0 - 54.0 fl Final   • MPV 05/17/2019 12.2* 6.0 - 12.0 fL Final   • Platelets 05/17/2019 263  140 - 450 10*3/mm3 Final   • Neutrophil % 05/17/2019 75.4  42.7 - 76.0 % Final   • Lymphocyte % 05/17/2019 17.7* 19.6 - 45.3 % Final   • Monocyte % 05/17/2019 4.4* 5.0 - 12.0 % Final   • Eosinophil % 05/17/2019 0.4  0.3 - 6.2 % Final   • Basophil % 05/17/2019 0.4  0.0 - 1.5 % Final   • Immature Grans % 05/17/2019 1.7* 0.0 - 0.5 % Final   • Neutrophils, Absolute 05/17/2019 7.39* 1.70 - 7.00 10*3/mm3 Final   • Lymphocytes, Absolute  05/17/2019 1.73  0.70 - 3.10 10*3/mm3 Final   • Monocytes, Absolute 05/17/2019 0.43  0.10 - 0.90 10*3/mm3 Final   • Eosinophils, Absolute 05/17/2019 0.04  0.00 - 0.40 10*3/mm3 Final   • Basophils, Absolute 05/17/2019 0.04  0.00 - 0.20 10*3/mm3 Final   • Immature Grans, Absolute 05/17/2019 0.17* 0.00 - 0.05 10*3/mm3 Final   • nRBC 05/17/2019 0.0  0.0 - 0.2 /100 WBC Final   • 25 Hydroxy, Vitamin D 05/17/2019 23.4* 30.0 - 100.0 ng/ml Final           Assessment  Problem List Items Addressed This Visit        Endocrine    Hyperthyroidism - Primary      Plan  Continue PTU 25 mg daily.   Labs today. I will monitor TFT every 2 months. Exacerbation is possible in postpartum period.   Follow-up in 4-6 months       Follow-up 6 weeks postpartum, repeat labs in the third trimester and early postpartum.

## 2019-11-02 LAB
T4 FREE SERPL-MCNC: 1.03 NG/DL (ref 0.93–1.7)
TSH SERPL DL<=0.05 MIU/L-ACNC: 0.88 UIU/ML (ref 0.27–4.2)

## 2020-03-31 ENCOUNTER — TELEPHONE (OUTPATIENT)
Dept: ENDOCRINOLOGY | Facility: CLINIC | Age: 35
End: 2020-03-31

## 2020-03-31 DIAGNOSIS — E05.90 HYPERTHYROIDISM: Primary | ICD-10-CM

## 2020-03-31 NOTE — TELEPHONE ENCOUNTER
Blood counts are within the normal range, metabolic panel is normal.  Lipid panel is in good range as well.  Thyroid function is within the normal range, even thought it is on the higher side. We can try increasing the dose and see if it improves her symptoms.   Is she taking PTU 1/2  Tab twice a day now? If she does she can try taking full tab in am and 1/2 tab in pm. Repoprt sx and recheck free T4 and TSH in 6 weeks, please send her orders.

## 2020-03-31 NOTE — TELEPHONE ENCOUNTER
Pt called to say that she was having her labs faxed to us.  I will send them to you  She wanted you to know that the last 2 weeks her heart has been racing and she has been sweating  She also said the last week she was put on a beta blocker metoprolol   She thinks her dose needs to be adjusted  Please call the pt [phone]

## 2020-04-03 ENCOUNTER — TELEPHONE (OUTPATIENT)
Dept: ENDOCRINOLOGY | Facility: CLINIC | Age: 35
End: 2020-04-03

## 2020-04-03 NOTE — TELEPHONE ENCOUNTER
Last tsh was normal 3/20  I would have her check bp and pulse and see what it is  Also be sure she is drinking plenty of fluids - breast feeding will tend to cause dehydration which may cause palpitations and headache  We can increase metoprolol if bp or pulse is higher (to 50 mg daily) but no increase in PTU is needed at this time  Thanks, randall

## 2020-04-03 NOTE — TELEPHONE ENCOUNTER
Can you please advise on a Bela patient?    She is currently take 1/2 tab of PTU 50 mg once daily. She is having a headache behind her eyes and her heart is raising. She also take metoprolol 25 mg. She wants to know if she should take a 1/2 tab of PTU twice daily or increase her metoprolol. She is breastfeeding so she wants a doctors advise.  Thank you

## 2020-05-29 ENCOUNTER — OFFICE VISIT (OUTPATIENT)
Dept: ENDOCRINOLOGY | Facility: CLINIC | Age: 35
End: 2020-05-29

## 2020-05-29 VITALS — HEIGHT: 67 IN | WEIGHT: 174 LBS | BODY MASS INDEX: 27.31 KG/M2

## 2020-05-29 DIAGNOSIS — E55.9 VITAMIN D DEFICIENCY: ICD-10-CM

## 2020-05-29 DIAGNOSIS — E05.90 HYPERTHYROIDISM: Primary | ICD-10-CM

## 2020-05-29 DIAGNOSIS — R00.2 PALPITATIONS: ICD-10-CM

## 2020-05-29 PROCEDURE — 99442 PR PHYS/QHP TELEPHONE EVALUATION 11-20 MIN: CPT | Performed by: INTERNAL MEDICINE

## 2020-05-29 RX ORDER — PROPYLTHIOURACIL 50 MG/1
50 TABLET ORAL 2 TIMES DAILY
Qty: 60 TABLET | Refills: 6 | Status: SHIPPED | OUTPATIENT
Start: 2020-05-29 | End: 2020-08-04 | Stop reason: SDUPTHER

## 2020-05-29 RX ORDER — ACETAMINOPHEN AND CODEINE PHOSPHATE 120; 12 MG/5ML; MG/5ML
1 SOLUTION ORAL DAILY
COMMUNITY
Start: 2020-03-24 | End: 2020-05-29

## 2020-05-29 NOTE — PROGRESS NOTES
"You have chosen to receive care through a telephone visit. Do you consent to use a telephone visit for your medical care today? Yes    11 min spent on medical discussions.     Chief complaint  Hyperthyroidism (Follow up) and Vitamin D Deficiency    Subjective   Jazlyn Mota is a 35 y.o. female is here today for follow-up.  Hyperthyroidism: on propylthiouracil since Oct 2015.   July 2017 patient had reaction to PTU - itching and rash.  She stopped PTU, felt awful, then restarted later at a lower dose of 50 mg daily.   She delivered a healthy boy in 09/2019, no complications.  3/20 she felt hyperthyroid and we increased the dose to 1/2 tab BID.    Patient c/o palpitations, heat intolerance and nervousness. Her sx didn't improve fatre dose increase      Medications    Current Outpatient Medications:   •  Cholecalciferol (VITAMIN D-1000 MAX ST) 25 MCG (1000 UT) tablet, Take 1,000 Units by mouth Daily., Disp: , Rfl:   •  metoprolol tartrate (LOPRESSOR) 25 MG tablet, Take 1 tablet by mouth 2 (Two) Times a Day., Disp: 60 tablet, Rfl: 6  •  propylthiouracil (PTU) 50 MG tablet, Take 1 tablet by mouth 2 (Two) Times a Day., Disp: 60 tablet, Rfl: 6    PMH  The following portions of the patient's history were reviewed and updated as appropriate: allergies, current medications, past family history, past medical history, past social history, past surgical history and problem list.  Mother has a history of Graves, treated with CARRERA. She had multiple symptoms and problems with management of thyroid disease after I-131 therapy.       Review of systems  Review of Systems   Constitutional: Positive for fatigue and weakness, heat intolerance  Cardiovascular: positive for palpitations.   Psychiatric/Behavioral: Positive for decreased concentration.   All other systems reviewed and are negative.      Physical exam  Objective   Height 170.2 cm (67\"), weight 78.9 kg (174 lb), currently breastfeeding.   Physical Exam     Neurological: She is " alert and oriented to person, place, and time.   Psychiatric: She has a normal mood and affect.           LABS AND IMAGING  No visits with results within 1 Month(s) from this visit.   Latest known visit with results is:   Office Visit on 11/01/2019   Component Date Value Ref Range Status   • TSH 11/01/2019 0.877  0.270 - 4.200 uIU/mL Final   • Free T4 11/01/2019 1.03  0.93 - 1.70 ng/dL Final           Assessment  Problem List Items Addressed This Visit        Digestive    Vitamin D deficiency    Relevant Orders    Vitamin D 25 Hydroxy       Endocrine    Hyperthyroidism - Primary    Relevant Medications    metoprolol tartrate (LOPRESSOR) 25 MG tablet    propylthiouracil (PTU) 50 MG tablet    Other Relevant Orders    T4, Free    TSH      Other Visit Diagnoses     Palpitations          Plan  Continue PTU and increase to 50 mg BID. Increase metoprolol to 25 mg BID to control palpitations.   Labs reviewed. TSH is < 0.2, free T4 is 2.39  Cont Vit D 1000 IU daily and recheck levels before next visit.   Follow-up in 3 months

## 2020-08-04 ENCOUNTER — LAB (OUTPATIENT)
Dept: LAB | Facility: HOSPITAL | Age: 35
End: 2020-08-04

## 2020-08-04 ENCOUNTER — OFFICE VISIT (OUTPATIENT)
Dept: ENDOCRINOLOGY | Facility: CLINIC | Age: 35
End: 2020-08-04

## 2020-08-04 VITALS
HEIGHT: 67 IN | BODY MASS INDEX: 27.26 KG/M2 | DIASTOLIC BLOOD PRESSURE: 76 MMHG | WEIGHT: 173.7 LBS | OXYGEN SATURATION: 98 % | SYSTOLIC BLOOD PRESSURE: 120 MMHG | HEART RATE: 72 BPM

## 2020-08-04 DIAGNOSIS — E05.90 HYPERTHYROIDISM: ICD-10-CM

## 2020-08-04 DIAGNOSIS — E55.9 VITAMIN D DEFICIENCY: ICD-10-CM

## 2020-08-04 DIAGNOSIS — E05.90 HYPERTHYROIDISM: Primary | ICD-10-CM

## 2020-08-04 PROCEDURE — 82306 VITAMIN D 25 HYDROXY: CPT | Performed by: INTERNAL MEDICINE

## 2020-08-04 PROCEDURE — 99213 OFFICE O/P EST LOW 20 MIN: CPT | Performed by: INTERNAL MEDICINE

## 2020-08-04 PROCEDURE — 84439 ASSAY OF FREE THYROXINE: CPT | Performed by: INTERNAL MEDICINE

## 2020-08-04 PROCEDURE — 84443 ASSAY THYROID STIM HORMONE: CPT | Performed by: INTERNAL MEDICINE

## 2020-08-04 RX ORDER — PROPYLTHIOURACIL 50 MG/1
50 TABLET ORAL 3 TIMES DAILY
Qty: 90 TABLET | Refills: 6 | Status: SHIPPED | OUTPATIENT
Start: 2020-08-04 | End: 2022-09-19

## 2020-08-04 NOTE — PROGRESS NOTES
"Chief complaint  Hyperthyroidism (Follow Up: IS breast feeding, and is baby is allergic to Dairy, peanuts, eggs. Is wondering if thyroid rx has any of those ingredients. ) and Vitamin D Deficiency    Subjective   Jazlyn Mota is a 35 y.o. female is here today for follow-up.  Hyperthyroidism: on propylthiouracil since Oct 2015.   July 2017 patient had reaction to PTU - itching and rash.  She stopped PTU, felt awful, then restarted later at a lower dose of 50 mg daily.   She delivered a healthy boy in 09/2019, no complications.  3/20 she felt hyperthyroid and we increased the dose to 1 tab TID.    Patient c/o palpitations, heat intolerance and nervousness, we increased the dose of PTU to 50 mg TID.   She is breastfeeding and asked if PTU or metoprolol has those components.      Medications    Current Outpatient Medications:   •  Cholecalciferol (VITAMIN D-1000 MAX ST) 25 MCG (1000 UT) tablet, Take 1,000 Units by mouth Daily., Disp: , Rfl:   •  metoprolol tartrate (LOPRESSOR) 25 MG tablet, Take 1 tablet by mouth 2 (Two) Times a Day., Disp: 60 tablet, Rfl: 6  •  propylthiouracil (PTU) 50 MG tablet, Take 1 tablet by mouth 3 (Three) Times a Day., Disp: 90 tablet, Rfl: 6    PMH  The following portions of the patient's history were reviewed and updated as appropriate: allergies, current medications, past family history, past medical history, past social history, past surgical history and problem list.  Mother has a history of Graves, treated with CARRERA. She had multiple symptoms and problems with management of thyroid disease after I-131 therapy.       Review of systems  Review of Systems   Constitutional: Positive for fatigue and weakness, heat intolerance  Cardiovascular: positive for palpitations.   Psychiatric/Behavioral: Positive for decreased concentration.   All other systems reviewed and are negative.      Physical exam  Objective   Blood pressure 120/76, pulse 72, height 170.2 cm (67\"), weight 78.8 kg (173 lb 11.2 " oz), SpO2 98 %, currently breastfeeding.   Physical Exam   Physical Exam   Constitutional: She is oriented to person, place, and time. She appears well-developed and well-nourished.   HENT:   Head: Normocephalic and atraumatic.   Eyes: Conjunctivae are normal.   Neck:   The neck is supple and symmetric   Cardiovascular: Normal rate, regular rhythm, normal heart sounds and intact distal pulses.   Pulmonary/Chest: Effort normal and breath sounds normal.   Musculoskeletal: She exhibits no edema or deformity.   Neurological: She is alert and oriented to person, place, and time.   Skin: Skin is warm and dry. No rash noted.   Psychiatric: She has a normal mood and affect. Thought content normal.   Vitals reviewed.          LABS AND IMAGING  Lab on 08/04/2020   Component Date Value Ref Range Status   • TSH 08/04/2020 0.447  0.270 - 4.200 uIU/mL Final   Office Visit on 08/04/2020   Component Date Value Ref Range Status   • Free T4 08/04/2020 0.99  0.93 - 1.70 ng/dL Final   • 25 Hydroxy, Vitamin D 08/04/2020 27.7* 30.0 - 100.0 ng/ml Final           Assessment  Problem List Items Addressed This Visit        Digestive    Vitamin D deficiency       Endocrine    Hyperthyroidism - Primary    Relevant Medications    propylthiouracil (PTU) 50 MG tablet      Plan  Continue PTU 50 mg TID. Increase metoprolol to 25 mg BID to control palpitations.   Labs reviewed.   Cont Vit D 1000 IU daily, levels are low. .   Follow-up in 3 months

## 2020-08-05 LAB
25(OH)D3 SERPL-MCNC: 27.7 NG/ML (ref 30–100)
T4 FREE SERPL-MCNC: 0.99 NG/DL (ref 0.93–1.7)
TSH SERPL DL<=0.05 MIU/L-ACNC: 0.45 UIU/ML (ref 0.27–4.2)

## 2020-12-09 ENCOUNTER — OFFICE VISIT (OUTPATIENT)
Dept: ENDOCRINOLOGY | Facility: CLINIC | Age: 35
End: 2020-12-09

## 2020-12-09 VITALS — BODY MASS INDEX: 25.74 KG/M2 | HEIGHT: 67 IN | WEIGHT: 164 LBS

## 2020-12-09 DIAGNOSIS — E55.9 VITAMIN D DEFICIENCY: ICD-10-CM

## 2020-12-09 DIAGNOSIS — E05.90 HYPERTHYROIDISM: Primary | ICD-10-CM

## 2020-12-09 PROCEDURE — 99213 OFFICE O/P EST LOW 20 MIN: CPT | Performed by: INTERNAL MEDICINE

## 2020-12-09 NOTE — PROGRESS NOTES
"You have chosen to receive care through a telephone visit. Do you consent to use a telephone visit for your medical care today? Yes  Chief complaint  Hyperthyroidism (Follow up:  States she has been having more headaches, )    Subjective   Jazlyn Mota is a 35 y.o. female is here today for follow-up.  Hyperthyroidism: on propylthiouracil since Oct 2015.   July 2017 patient had reaction to PTU - itching and rash.  She stopped PTU, felt awful, then restarted later at a lower dose of 50 mg daily.   She delivered a healthy boy in 09/2019, no complications.  3/20 she felt hyperthyroid and we increased the dose to 1 tab TID.    Patient c/o increased headaches behind the left eye. She has palpitations when metoprolol is wearing off.    Medications    Current Outpatient Medications:   •  Cholecalciferol (VITAMIN D-1000 MAX ST) 25 MCG (1000 UT) tablet, Take 1,000 Units by mouth Daily., Disp: , Rfl:   •  metoprolol tartrate (LOPRESSOR) 25 MG tablet, Take 1 tablet by mouth 2 (Two) Times a Day., Disp: 60 tablet, Rfl: 6  •  propylthiouracil (PTU) 50 MG tablet, Take 1 tablet by mouth 3 (Three) Times a Day., Disp: 90 tablet, Rfl: 6    PMH  The following portions of the patient's history were reviewed and updated as appropriate: allergies, current medications, past family history, past medical history, past social history, past surgical history and problem list.  Mother has a history of Graves, treated with CARRERA. She had multiple symptoms and problems with management of thyroid disease after I-131 therapy.       Review of systems  Review of Systems   Constitutional: Positive for fatigue and weakness, heat intolerance  Cardiovascular: positive for palpitations, headache behind the eye.   Psychiatric/Behavioral: Positive for decreased concentration.   All other systems reviewed and are negative.      Physical exam  Objective   Vitals:    12/09/20 1148   Weight: 74.4 kg (164 lb)   Height: 170.2 cm (67\")        Physical Exam "   Constitutional: She is oriented to person, place, and time.   Neurological: She is alert and oriented to person, place, and time.   Psychiatric: Mood and thought content normal.           LABS AND IMAGING  No visits with results within 1 Month(s) from this visit.   Latest known visit with results is:   Lab on 08/04/2020   Component Date Value Ref Range Status   • TSH 08/04/2020 0.447  0.270 - 4.200 uIU/mL Final           Assessment  Problems Addressed this Visit        Digestive    Vitamin D deficiency       Endocrine    Hyperthyroidism - Primary      Diagnoses       Codes Comments    Hyperthyroidism    -  Primary ICD-10-CM: E05.90  ICD-9-CM: 242.90     Vitamin D deficiency     ICD-10-CM: E55.9  ICD-9-CM: 268.9       Plan  Continue PTU 50 mg TID. Will likely increase the dose after lab review.   Cont metoprolol 25 mg BID to control palpitations.     Cont Vit D 1000 IU daily, levels are low.  Repeat labs and will increase the PTU to 2 tab BID (100 mg BID). Her sx are consistent with hyperthyroidism.   Follow-up in 3 months    9 min spent on med discussion

## 2020-12-21 ENCOUNTER — TELEPHONE (OUTPATIENT)
Dept: ENDOCRINOLOGY | Facility: CLINIC | Age: 35
End: 2020-12-21

## 2020-12-31 DIAGNOSIS — E05.90 HYPERTHYROIDISM: Primary | ICD-10-CM

## 2021-03-05 ENCOUNTER — TELEPHONE (OUTPATIENT)
Dept: ENDOCRINOLOGY | Facility: CLINIC | Age: 36
End: 2021-03-05

## 2021-03-05 DIAGNOSIS — E05.90 HYPERTHYROIDISM: Primary | ICD-10-CM

## 2021-03-05 NOTE — TELEPHONE ENCOUNTER
Patient notified of results, expressed understanding. We attempted to schedule an appt. Agnesember, but I was unable to schedule it  I will send to jimbo to schedule  Labs will be ordered and mailed out

## 2021-03-05 NOTE — TELEPHONE ENCOUNTER
----- Message from Evelyn CHESTER MD sent at 3/4/2021  3:27 PM EST -----  Please call patient with lab results : thyroid levels are low. Please discontinue PTU or reduce the dose.  Please ask at first if patient is having any new symptoms . Did she reduce the dose to 25 mg twice a day in December?   If she is having symptoms and she did reduce the dose in dec—-> stop PTU now and recheck levels in the month.   Please send her orders for TSH and free T4.   Also make sure she has some appointment scheduled.

## 2021-04-06 DIAGNOSIS — E05.90 HYPERTHYROIDISM: Primary | ICD-10-CM

## 2021-11-12 ENCOUNTER — OFFICE VISIT (OUTPATIENT)
Dept: ENDOCRINOLOGY | Facility: CLINIC | Age: 36
End: 2021-11-12

## 2021-11-12 ENCOUNTER — LAB (OUTPATIENT)
Dept: LAB | Facility: HOSPITAL | Age: 36
End: 2021-11-12

## 2021-11-12 VITALS
BODY MASS INDEX: 26.34 KG/M2 | HEART RATE: 65 BPM | OXYGEN SATURATION: 98 % | DIASTOLIC BLOOD PRESSURE: 74 MMHG | SYSTOLIC BLOOD PRESSURE: 116 MMHG | WEIGHT: 167.8 LBS | HEIGHT: 67 IN

## 2021-11-12 DIAGNOSIS — E05.90 HYPERTHYROIDISM: Primary | ICD-10-CM

## 2021-11-12 DIAGNOSIS — E55.9 VITAMIN D DEFICIENCY: ICD-10-CM

## 2021-11-12 LAB
25(OH)D3 SERPL-MCNC: 38.9 NG/ML (ref 30–100)
T4 FREE SERPL-MCNC: 1.06 NG/DL (ref 0.93–1.7)
TSH SERPL DL<=0.05 MIU/L-ACNC: 2.54 UIU/ML (ref 0.27–4.2)

## 2021-11-12 PROCEDURE — 99214 OFFICE O/P EST MOD 30 MIN: CPT | Performed by: INTERNAL MEDICINE

## 2021-11-12 PROCEDURE — 82306 VITAMIN D 25 HYDROXY: CPT | Performed by: INTERNAL MEDICINE

## 2021-11-12 PROCEDURE — 84439 ASSAY OF FREE THYROXINE: CPT | Performed by: INTERNAL MEDICINE

## 2021-11-12 PROCEDURE — 84443 ASSAY THYROID STIM HORMONE: CPT | Performed by: INTERNAL MEDICINE

## 2021-11-12 RX ORDER — BUSPIRONE HYDROCHLORIDE 5 MG/1
5 TABLET ORAL 2 TIMES DAILY
COMMUNITY
Start: 2021-10-07

## 2021-11-12 NOTE — PROGRESS NOTES
"Chief complaint  Hyperthyroidism (Follow Up) and Vitamin D Deficiency    Subjective   Jazlyn Mota is a 36 y.o. female is here today for follow-up.  Hyperthyroidism: on propylthiouracil since Oct 2015.   July 2017 patient had reaction to PTU - itching and rash.  She stopped PTU, felt awful, then restarted later at a lower dose of 50 mg daily.   She delivered a healthy boy in 09/2019, no complications.  3/20 she felt hyperthyroid and we increased the dose to 1 tab TID.    3/21 we have slowly discontinue PTU.     Patient c/o increased headaches behind the left eye. Palpitations improved after buspar started.     Medications    Current Outpatient Medications:   •  busPIRone (BUSPAR) 5 MG tablet, Take 5 mg by mouth 2 (Two) Times a Day., Disp: , Rfl:   •  Cholecalciferol (VITAMIN D-1000 MAX ST) 25 MCG (1000 UT) tablet, Take 1,000 Units by mouth Daily., Disp: , Rfl:   •  metoprolol tartrate (LOPRESSOR) 25 MG tablet, Take 1 tablet by mouth 2 (Two) Times a Day., Disp: 60 tablet, Rfl: 11  •  propylthiouracil (PTU) 50 MG tablet, Take 1 tablet by mouth 3 (Three) Times a Day., Disp: 90 tablet, Rfl: 6      Review of systems  Review of Systems   Cardiovascular: Positive for palpitations.   Neurological: Positive for headache (behind the left eye).   Psychiatric/Behavioral: The patient is nervous/anxious.          Physical exam  Objective   Vitals:    11/12/21 0826   BP: 116/74   Pulse: 65   SpO2: 98%   Weight: 76.1 kg (167 lb 12.8 oz)   Height: 170.2 cm (67\")   Body mass index is 26.28 kg/m².     Physical Exam   Constitutional: She is oriented to person, place, and time. She appears well-developed.   HENT:   Head: Normocephalic and atraumatic.   Cardiovascular: Normal rate, regular rhythm, normal heart sounds and normal pulses.   Pulmonary/Chest: Effort normal and breath sounds normal.   Neurological: She is alert and oriented to person, place, and time.   Skin: Skin is warm and dry. No rash noted.   Psychiatric: Mood and " thought content normal.   Vitals reviewed.          LABS AND IMAGING  No visits with results within 1 Month(s) from this visit.   Latest known visit with results is:   Lab on 08/04/2020   Component Date Value Ref Range Status   • TSH 08/04/2020 0.447  0.270 - 4.200 uIU/mL Final           Assessment  Problems Addressed this Visit        Other    Hyperthyroidism - Primary      Diagnoses       Codes Comments    Hyperthyroidism    -  Primary ICD-10-CM: E05.90  ICD-9-CM: 242.90       Plan  We have discontinued the PTU when thyroid levels normalized in 3/2021  Cont metoprolol 25 mg BID to control palpitations.   Repeat thyroid levels now.   Cont Vit D 1000 IU daily, levels were low.  Follow-up in 3 months

## 2022-04-20 ENCOUNTER — OFFICE VISIT (OUTPATIENT)
Dept: OBSTETRICS AND GYNECOLOGY | Facility: CLINIC | Age: 37
End: 2022-04-20

## 2022-04-20 VITALS
BODY MASS INDEX: 24.1 KG/M2 | DIASTOLIC BLOOD PRESSURE: 83 MMHG | WEIGHT: 159 LBS | HEIGHT: 68 IN | SYSTOLIC BLOOD PRESSURE: 120 MMHG | HEART RATE: 80 BPM

## 2022-04-20 DIAGNOSIS — N60.19 FIBROCYSTIC BREAST DISEASE (FCBD), UNSPECIFIED LATERALITY: ICD-10-CM

## 2022-04-20 DIAGNOSIS — N64.4 BREAST PAIN, RIGHT: Primary | ICD-10-CM

## 2022-04-20 DIAGNOSIS — Z80.3 FAMILY HISTORY OF BREAST CANCER: ICD-10-CM

## 2022-04-20 PROCEDURE — 99213 OFFICE O/P EST LOW 20 MIN: CPT | Performed by: OBSTETRICS & GYNECOLOGY

## 2022-04-20 NOTE — PROGRESS NOTES
"GYN Problem/Follow Up Visit    Chief Complaint   Patient presents with   • RIGHT BREAST PAIN           HPI  Jazlyn Mota is a 36 y.o. female, , who presents for right breast pain x one month. States it feels more tender in the central part of her breast. Denies masses but states her breasts feel lumpy. Stopped breastfeeding a year ago. Denies nipple d/c. pgm had breast cancer. Her mom is adopted.       Additional OB/GYN History   Patient's last menstrual period was 2022 (approximate).  Allergies : Benzoyl peroxide and Fluconazole     The additional following portions of the patient's history were reviewed and updated as appropriate: allergies, current medications, past family history, past medical history, past social history, past surgical history and problem list.    Review of Systems    I have reviewed and agree with the HPI, ROS, and historical information as entered above. Maryann Larose, APRN    Objective   /83   Pulse 80   Ht 172.7 cm (68\")   Wt 72.1 kg (159 lb)   LMP 2022 (Approximate)   BMI 24.18 kg/m²     Physical Exam  Vitals reviewed.   Chest:   Breasts: Breasts are symmetrical.      Right: Tenderness (generalized) present. No swelling, bleeding, inverted nipple, mass, nipple discharge, skin change, axillary adenopathy or supraclavicular adenopathy.      Left: Normal.        Comments: Bilateral fibrocystic breast dz  Lymphadenopathy:      Upper Body:      Right upper body: No supraclavicular, axillary or pectoral adenopathy.   Skin:     General: Skin is warm and dry.   Neurological:      Mental Status: She is alert and oriented to person, place, and time.            Assessment and Plan    Diagnoses and all orders for this visit:    1. Breast pain, right (Primary)  -     Mammo Diagnostic Digital Tomosynthesis Bilateral With CAD; Future  -     US Breast Right Limited; Future    2. Fibrocystic breast disease (FCBD), unspecified laterality  -     Mammo Diagnostic Digital " Tomosynthesis Bilateral With CAD; Future  -     US Breast Right Limited; Future    3. Family history of breast cancer  -     Mammo Diagnostic Digital Tomosynthesis Bilateral With CAD; Future  -     US Breast Right Limited; Future    will check jacques diagnostic mammo and prn u/s. Discussed that it could be hormonal or musculoskeletal also. Wear a comfortable well-fitted bra. Will f/u based on imaging results.     Counseling:  She understands the importance of having the above orders performed in a timely fashion.  She is encouraged to review her results online and/or contact or office if she has questions.     Follow Up:  Return for testing f/u prn.      Maryann Larose, APRN  04/20/2022

## 2022-04-28 ENCOUNTER — TELEPHONE (OUTPATIENT)
Dept: ENDOCRINOLOGY | Facility: CLINIC | Age: 37
End: 2022-04-28

## 2022-04-28 DIAGNOSIS — E55.9 VITAMIN D DEFICIENCY: ICD-10-CM

## 2022-04-28 DIAGNOSIS — E05.90 HYPERTHYROIDISM: Primary | ICD-10-CM

## 2022-04-28 NOTE — TELEPHONE ENCOUNTER
PATIENT HAD TO CANCEL APPT ON 5/20. NEXT AVAILABLE WAS OFFERED BUT THAT WOULD BE IN AUG. PATIENT WOULD LIKE TO BE SEEN SOONER THAN AUG. PATIENT WILL NEED NOTHING EARLIER THAN LATE MORNING AS SHE LIVES 2 HOURS AWAY. SHE ALSO STATES THAT SHE CAN HAVE ANY NECESSARY LABS DONE LOCALLY IF DR GUPTA WANTS TO ORDER LABS.     CALL BACK

## 2022-04-28 NOTE — TELEPHONE ENCOUNTER
Returned pt call. We will place lab orders, she will have her levels checked as she feels they ae off, and she rescheduled ramón for September.

## 2022-05-20 ENCOUNTER — HOSPITAL ENCOUNTER (OUTPATIENT)
Dept: ULTRASOUND IMAGING | Facility: HOSPITAL | Age: 37
Discharge: HOME | End: 2022-05-20

## 2022-05-20 ENCOUNTER — HOSPITAL ENCOUNTER (OUTPATIENT)
Dept: MAMMOGRAPHY | Facility: HOSPITAL | Age: 37
Discharge: HOME | End: 2022-05-20

## 2022-05-20 DIAGNOSIS — N64.4 BREAST PAIN, RIGHT: ICD-10-CM

## 2022-05-20 DIAGNOSIS — Z80.3 FAMILY HISTORY OF BREAST CANCER: ICD-10-CM

## 2022-05-20 DIAGNOSIS — N60.19 FIBROCYSTIC BREAST DISEASE (FCBD), UNSPECIFIED LATERALITY: ICD-10-CM

## 2022-05-20 PROCEDURE — G0279 TOMOSYNTHESIS, MAMMO: HCPCS

## 2022-05-20 PROCEDURE — 76642 ULTRASOUND BREAST LIMITED: CPT

## 2022-05-20 PROCEDURE — 77066 DX MAMMO INCL CAD BI: CPT

## 2022-09-13 ENCOUNTER — TELEPHONE (OUTPATIENT)
Dept: ENDOCRINOLOGY | Facility: CLINIC | Age: 37
End: 2022-09-13

## 2022-09-13 NOTE — TELEPHONE ENCOUNTER
----- Message from Evelyn CHESTER MD sent at 9/13/2022 10:03 AM EDT -----  Please call patient and let her know that the thyroid labs are normal.

## 2022-09-19 ENCOUNTER — TELEPHONE (OUTPATIENT)
Dept: ENDOCRINOLOGY | Facility: CLINIC | Age: 37
End: 2022-09-19

## 2022-09-19 DIAGNOSIS — E05.90 HYPERTHYROIDISM: ICD-10-CM

## 2022-09-19 RX ORDER — PROPYLTHIOURACIL 50 MG/1
TABLET ORAL
Qty: 15 TABLET | Refills: 0 | Status: SHIPPED | OUTPATIENT
Start: 2022-09-19 | End: 2022-09-29 | Stop reason: SDUPTHER

## 2022-09-19 NOTE — TELEPHONE ENCOUNTER
----- Message from Jazlyn Mota sent at 9/17/2022  8:41 AM EDT -----  Regarding: Lab results  I do not think I have any refills left.  My pharmacy is [facility] in [place] [address]   Thanks!

## 2022-09-19 NOTE — TELEPHONE ENCOUNTER
Lab results    Jazlyn Mota Anna V, MD 2 days ago     I do not think I have any refills left.  [facility] [place] [address]   Thanks!          Evelyn Beal MD Dixon, Kristin D 3 days ago     OK. Just wanted to confirm that you didn't start it since last visit. DO you still have PTU prescription? You could start at 1/2 tab daily until your visit. Then we can reassess the sx and recheck the levels.

## 2022-09-29 ENCOUNTER — OFFICE VISIT (OUTPATIENT)
Dept: ENDOCRINOLOGY | Facility: CLINIC | Age: 37
End: 2022-09-29

## 2022-09-29 VITALS
BODY MASS INDEX: 24.07 KG/M2 | HEIGHT: 68 IN | HEART RATE: 83 BPM | OXYGEN SATURATION: 100 % | WEIGHT: 158.8 LBS | SYSTOLIC BLOOD PRESSURE: 108 MMHG | DIASTOLIC BLOOD PRESSURE: 62 MMHG

## 2022-09-29 DIAGNOSIS — E04.1 RIGHT THYROID NODULE: ICD-10-CM

## 2022-09-29 DIAGNOSIS — E05.90 HYPERTHYROIDISM: Primary | ICD-10-CM

## 2022-09-29 PROCEDURE — 99214 OFFICE O/P EST MOD 30 MIN: CPT | Performed by: INTERNAL MEDICINE

## 2022-09-29 RX ORDER — BUSPIRONE HYDROCHLORIDE 10 MG/1
10 TABLET ORAL 2 TIMES DAILY
COMMUNITY
Start: 2022-08-27 | End: 2022-09-29 | Stop reason: SDUPTHER

## 2022-09-29 RX ORDER — PROPYLTHIOURACIL 50 MG/1
TABLET ORAL
Qty: 20 TABLET | Refills: 6 | Status: SHIPPED | OUTPATIENT
Start: 2022-09-29

## 2022-09-29 NOTE — PROGRESS NOTES
"Chief complaint  Hyperthyroidism (Follow UP)    Subjective   Jazlyn Mota is a 37 y.o. female is here today for follow-up.  Hyperthyroidism: on propylthiouracil since Oct 2015.   July 2017 patient had reaction to PTU - itching and rash.  Then restarted later with no problems.   She delivered a healthy boy in 09/2019, no complications.  We have slowly decreased PTU dose afterwards and 3/21 we have discontinue PTU.     Patient c/o increased headaches behind the left eye started 3-4 weeks ago. Palpitations were more frequent. She also had some dizziness. We have tested TFT and TSH was 1.4, but we started thyroid medication anyway. She feels that sx improved after the 1/2 tab PTU started (25 mg daily)    Medications    Current Outpatient Medications:   •  busPIRone (BUSPAR) 5 MG tablet, Take 5 mg by mouth 2 (Two) Times a Day., Disp: , Rfl:   •  cholecalciferol (Cholecalciferol) 25 MCG (1000 UT) tablet, Take 1,000 Units by mouth Daily., Disp: , Rfl:   •  propylthiouracil (PTU) 50 MG tablet, 1/2 tab daily, Disp: 15 tablet, Rfl: 0  •  metoprolol tartrate (LOPRESSOR) 25 MG tablet, Take 1 tablet by mouth 2 (Two) Times a Day., Disp: 60 tablet, Rfl: 11      Review of systems  Review of Systems   Cardiovascular: Positive for palpitations.   Neurological: Positive for headache (behind the left eye).   Psychiatric/Behavioral: The patient is nervous/anxious.          Physical exam  Objective   Vitals:    09/29/22 1216   BP: 108/62   Pulse: 83   SpO2: 100%   Weight: 72 kg (158 lb 12.8 oz)   Height: 172.7 cm (68\")   Body mass index is 24.15 kg/m².     Physical Exam   Constitutional: She is oriented to person, place, and time. She appears well-developed.   HENT:   Head: Normocephalic and atraumatic.   Neck: Thyromegaly (priminent right lobe) present.   Cardiovascular: Normal rate, regular rhythm, normal heart sounds and normal pulses.   Pulmonary/Chest: Effort normal and breath sounds normal.   Neurological: She is alert and " oriented to person, place, and time.   Skin: Skin is warm and dry. No rash noted.   Psychiatric: Mood and thought content normal.   Vitals reviewed.          LABS AND IMAGING  No visits with results within 1 Month(s) from this visit.   Latest known visit with results is:   Office Visit on 11/12/2021   Component Date Value Ref Range Status   • Free T4 11/12/2021 1.06  0.93 - 1.70 ng/dL Final   • TSH 11/12/2021 2.540  0.270 - 4.200 uIU/mL Final   • 25 Hydroxy, Vitamin D 11/12/2021 38.9  30.0 - 100.0 ng/ml Final           Assessment  Problems Addressed this Visit        Other    Hyperthyroidism - Primary      Diagnoses       Codes Comments    Hyperthyroidism    -  Primary ICD-10-CM: E05.90  ICD-9-CM: 242.90       Plan  Cont PTU at 25 mh daily and repeat labs in 2-3 weeks.   Right thyroid lobe appear prominent and tender on the exam. I have performed bedside ultrasound and 1 cm solid nodule is seen in the right lobe inferiorly. It has benign u/s characteristics and I will follow with u/s in 6-12 months.   Follow-up in 6 months

## 2022-11-03 ENCOUNTER — PATIENT MESSAGE (OUTPATIENT)
Dept: ENDOCRINOLOGY | Facility: CLINIC | Age: 37
End: 2022-11-03

## 2023-03-28 ENCOUNTER — OFFICE VISIT (OUTPATIENT)
Dept: ENDOCRINOLOGY | Facility: CLINIC | Age: 38
End: 2023-03-28
Payer: COMMERCIAL

## 2023-03-28 VITALS
SYSTOLIC BLOOD PRESSURE: 92 MMHG | OXYGEN SATURATION: 99 % | WEIGHT: 161 LBS | HEART RATE: 81 BPM | RESPIRATION RATE: 18 BRPM | BODY MASS INDEX: 24.4 KG/M2 | HEIGHT: 68 IN | DIASTOLIC BLOOD PRESSURE: 58 MMHG

## 2023-03-28 DIAGNOSIS — E05.90 HYPERTHYROIDISM: Primary | ICD-10-CM

## 2023-03-28 DIAGNOSIS — E04.1 RIGHT THYROID NODULE: ICD-10-CM

## 2023-03-28 LAB
T4 FREE SERPL-MCNC: 1.34 NG/DL (ref 0.93–1.7)
TSH SERPL DL<=0.05 MIU/L-ACNC: 1.11 UIU/ML (ref 0.27–4.2)

## 2023-03-28 PROCEDURE — 76536 US EXAM OF HEAD AND NECK: CPT | Performed by: INTERNAL MEDICINE

## 2023-03-28 PROCEDURE — 84443 ASSAY THYROID STIM HORMONE: CPT | Performed by: INTERNAL MEDICINE

## 2023-03-28 PROCEDURE — 84439 ASSAY OF FREE THYROXINE: CPT | Performed by: INTERNAL MEDICINE

## 2023-03-28 PROCEDURE — 99214 OFFICE O/P EST MOD 30 MIN: CPT | Performed by: INTERNAL MEDICINE

## 2023-03-28 RX ORDER — BUSPIRONE HYDROCHLORIDE 10 MG/1
1 TABLET ORAL EVERY 12 HOURS SCHEDULED
COMMUNITY
Start: 2022-12-05

## 2023-03-28 NOTE — PROGRESS NOTES
"Chief complaint  Hyperthyroidism (6 MO F/U with US)    Subjective   Jazlyn Mota is a 37 y.o. female is here today for follow-up.  Hyperthyroidism: on propylthiouracil since Oct 2015.   July 2017 patient had reaction to PTU - itching and rash.  Then restarted later with no problems.   She delivered a healthy boy in 09/2019, no complications.  We have slowly decreased PTU dose afterwards and 3/21 we have discontinue PTU.     We have tested TFT and TSH was 1.4, but we started thyroid medication anyway. She feels that sx improved after the 1/2 tab PTU started (25 mg daily). Now her sx changed.   She feels that something is off. She experiences fatigue, headaches, altered smell - she smells smoke all the time. She had COVID in January and doesn't feel well since then.  She also has seasonal allergies. She also noted some breakouts.   Medications    Current Outpatient Medications:   •  busPIRone (BUSPAR) 10 MG tablet, Take 1 tablet by mouth Every 12 (Twelve) Hours., Disp: , Rfl:   •  busPIRone (BUSPAR) 5 MG tablet, Take 1 tablet by mouth 2 (Two) Times a Day., Disp: , Rfl:   •  cholecalciferol (Cholecalciferol) 25 MCG (1000 UT) tablet, Take 1 tablet by mouth Daily., Disp: , Rfl:   •  propylthiouracil (PTU) 50 MG tablet, 1/2 tab daily, Disp: 20 tablet, Rfl: 6      Review of systems  Review of Systems   Constitutional: Positive for fatigue.   Cardiovascular: Positive for palpitations.   Neurological: Positive for headache.   Psychiatric/Behavioral: The patient is nervous/anxious.          Physical exam  Objective   Vitals:    03/28/23 1431   BP: 92/58   BP Location: Left arm   Patient Position: Sitting   Cuff Size: Adult   Pulse: 81   Resp: 18   SpO2: 99%   Weight: 73 kg (161 lb)   Height: 172.7 cm (68\")   Body mass index is 24.48 kg/m².     Physical Exam   Constitutional: She is oriented to person, place, and time. She appears well-developed.   HENT:   Head: Normocephalic and atraumatic.   Neck: Thyromegaly (priminent " right lobe) present.   Cardiovascular: Normal rate, regular rhythm, normal heart sounds and normal pulses.   Pulmonary/Chest: Effort normal and breath sounds normal.   Neurological: She is alert and oriented to person, place, and time.   Skin: Skin is warm and dry. No rash noted.   Psychiatric: Mood and thought content normal.   Vitals reviewed.          LABS AND IMAGING  No visits with results within 1 Month(s) from this visit.   Latest known visit with results is:   Office Visit on 11/12/2021   Component Date Value Ref Range Status   • Free T4 11/12/2021 1.06  0.93 - 1.70 ng/dL Final   • TSH 11/12/2021 2.540  0.270 - 4.200 uIU/mL Final   • 25 Hydroxy, Vitamin D 11/12/2021 38.9  30.0 - 100.0 ng/ml Final     Thyroid ultrasound 03/28/23        Real time high resolution imaging of the thyroid gland was performed in transverse and longitudinal planes.   Previous images were reviewed and compared to the current appearance to assess stability.       The right lobe measured 4.99 cm L x 2.23 cm AP x 3.29 cm in TV dimension.    The isthmus measured 0.61 cm in thickness.    The left thyroid lobe measured 5.13 cm L x 2.01 cm AP x 2.32 cm in TV dimension.    Thyroid gland is prominent and homogeneous and contains single nodule in the right lobe.    Nodule 1   is located in the right lower lobe and measures 1.18 x 0.87 x 1.03 cm (L X AP X TV).  This nodule is solid, homogeneous, hypoechoic with well-defined margins, and Grade II vascularity on Color Flow Doppler.  It has no artifacts    No pathologic lymph nodes were seen.      Assessment: Right thyroid nodule is stable in size and appearance  Follow-up ultrasound in 12 months      Assessment  Problems Addressed this Visit        Other    Hyperthyroidism - Primary    Relevant Orders    TSH    T4, Free    Right thyroid nodule    Relevant Orders    US Thyroid (Completed)   Diagnoses       Codes Comments    Hyperthyroidism    -  Primary ICD-10-CM: E05.90  ICD-9-CM: 242.90      Right thyroid nodule     ICD-10-CM: E04.1  ICD-9-CM: 241.0       Plan  Cont PTU 25 mg daily and repeat labs today. Most likely we can d/c the medication after the lab review.      ultrasound showed 1 cm solid nodule in the right lobe inferiorly. It has benign u/s characteristics and appears stable compared to previous evaluation.  I will continue to follow-up with yearly ultrasounds      Follow-up in 6 months

## 2023-09-26 ENCOUNTER — OFFICE VISIT (OUTPATIENT)
Dept: ENDOCRINOLOGY | Facility: CLINIC | Age: 38
End: 2023-09-26
Payer: COMMERCIAL

## 2023-09-26 VITALS
HEIGHT: 68 IN | WEIGHT: 160 LBS | SYSTOLIC BLOOD PRESSURE: 100 MMHG | BODY MASS INDEX: 24.25 KG/M2 | HEART RATE: 75 BPM | DIASTOLIC BLOOD PRESSURE: 60 MMHG

## 2023-09-26 DIAGNOSIS — E55.9 VITAMIN D DEFICIENCY: ICD-10-CM

## 2023-09-26 DIAGNOSIS — E05.90 HYPERTHYROIDISM: Primary | ICD-10-CM

## 2023-09-26 DIAGNOSIS — L70.9 ACNE, UNSPECIFIED ACNE TYPE: ICD-10-CM

## 2023-09-26 DIAGNOSIS — E04.1 RIGHT THYROID NODULE: ICD-10-CM

## 2023-09-26 LAB
ALBUMIN SERPL-MCNC: 4.2 G/DL (ref 3.5–5.2)
ALBUMIN/GLOB SERPL: 1.4 G/DL
ALP SERPL-CCNC: 122 U/L (ref 39–117)
ALT SERPL W P-5'-P-CCNC: 31 U/L (ref 1–33)
ANION GAP SERPL CALCULATED.3IONS-SCNC: 9.1 MMOL/L (ref 5–15)
AST SERPL-CCNC: 32 U/L (ref 1–32)
BILIRUB SERPL-MCNC: 0.4 MG/DL (ref 0–1.2)
BUN SERPL-MCNC: 9 MG/DL (ref 6–20)
BUN/CREAT SERPL: 12.3 (ref 7–25)
CALCIUM SPEC-SCNC: 9.2 MG/DL (ref 8.6–10.5)
CHLORIDE SERPL-SCNC: 106 MMOL/L (ref 98–107)
CO2 SERPL-SCNC: 24.9 MMOL/L (ref 22–29)
CREAT SERPL-MCNC: 0.73 MG/DL (ref 0.57–1)
EGFRCR SERPLBLD CKD-EPI 2021: 107.4 ML/MIN/1.73
GLOBULIN UR ELPH-MCNC: 3.1 GM/DL
GLUCOSE SERPL-MCNC: 79 MG/DL (ref 65–99)
POTASSIUM SERPL-SCNC: 4.2 MMOL/L (ref 3.5–5.2)
PROT SERPL-MCNC: 7.3 G/DL (ref 6–8.5)
SODIUM SERPL-SCNC: 140 MMOL/L (ref 136–145)
T4 FREE SERPL-MCNC: 1.11 NG/DL (ref 0.93–1.7)
T4 SERPL-MCNC: 8.21 MCG/DL (ref 4.5–11.7)
TSH SERPL DL<=0.05 MIU/L-ACNC: 1.13 UIU/ML (ref 0.27–4.2)

## 2023-09-26 PROCEDURE — 80053 COMPREHEN METABOLIC PANEL: CPT | Performed by: INTERNAL MEDICINE

## 2023-09-26 PROCEDURE — 84443 ASSAY THYROID STIM HORMONE: CPT | Performed by: INTERNAL MEDICINE

## 2023-09-26 PROCEDURE — 82306 VITAMIN D 25 HYDROXY: CPT | Performed by: INTERNAL MEDICINE

## 2023-09-26 PROCEDURE — 84439 ASSAY OF FREE THYROXINE: CPT | Performed by: INTERNAL MEDICINE

## 2023-09-26 PROCEDURE — 99214 OFFICE O/P EST MOD 30 MIN: CPT | Performed by: INTERNAL MEDICINE

## 2023-09-26 PROCEDURE — 82642 DIHYDROTESTOSTERONE: CPT | Performed by: INTERNAL MEDICINE

## 2023-09-26 NOTE — PROGRESS NOTES
"Chief complaint  Hyperthyroidism    Subjective   Jazlyn Mota is a 38 y.o. female is here today for follow-up.  Hyperthyroidism: on propylthiouracil since Oct 2015.   July 2017 patient had reaction to PTU - itching and rash.  Then restarted later with no problems.   She delivered a healthy boy in 09/2019, no complications.  We have slowly decreased PTU dose afterwards and 3/21 we have discontinue PTU.   Later on she developed symptoms of hyperthyroidism and restarted PTu at 1/2 tab daily. TFT were normal but she remained on the medication. She also has       Medications    Current Outpatient Medications:     busPIRone (BUSPAR) 10 MG tablet, Take 1 tablet by mouth Every 12 (Twelve) Hours., Disp: , Rfl:     busPIRone (BUSPAR) 5 MG tablet, Take 1 tablet by mouth 2 (Two) Times a Day., Disp: , Rfl:     cholecalciferol (Cholecalciferol) 25 MCG (1000 UT) tablet, Take 1 tablet by mouth Daily., Disp: , Rfl:     propylthiouracil (PTU) 50 MG tablet, 1/2 tab daily, Disp: 20 tablet, Rfl: 6      Review of systems  Review of Systems   Constitutional:  Positive for fatigue.   Neurological:  Positive for headache.   Psychiatric/Behavioral:  The patient is nervous/anxious.        Physical exam  Objective   Vitals:    09/26/23 1149   BP: 100/60   Pulse: 75   Weight: 72.6 kg (160 lb)   Height: 172.7 cm (67.99\")   Body mass index is 24.33 kg/m².     Physical Exam   Constitutional: She is oriented to person, place, and time. She appears well-developed.   HENT:   Head: Normocephalic and atraumatic.   Neck: Thyromegaly (priminent right lobe) present.   Cardiovascular: Normal rate, regular rhythm, normal heart sounds and normal pulses.   Pulmonary/Chest: Effort normal and breath sounds normal.   Neurological: She is alert and oriented to person, place, and time.   Skin: Skin is warm and dry. No rash noted.   Psychiatric: Mood and thought content normal.   Vitals reviewed.        LABS AND IMAGING  No visits with results within 1 Month(s) " from this visit.   Latest known visit with results is:   Office Visit on 03/28/2023   Component Date Value Ref Range Status    TSH 03/28/2023 1.110  0.270 - 4.200 uIU/mL Final    Free T4 03/28/2023 1.34  0.93 - 1.70 ng/dL Final     Thyroid ultrasound 03/28/23        Real time high resolution imaging of the thyroid gland was performed in transverse and longitudinal planes.   Previous images were reviewed and compared to the current appearance to assess stability.       The right lobe measured 4.99 cm L x 2.23 cm AP x 3.29 cm in TV dimension.    The isthmus measured 0.61 cm in thickness.    The left thyroid lobe measured 5.13 cm L x 2.01 cm AP x 2.32 cm in TV dimension.    Thyroid gland is prominent and homogeneous and contains single nodule in the right lobe.    Nodule 1   is located in the right lower lobe and measures 1.18 x 0.87 x 1.03 cm (L X AP X TV).  This nodule is solid, homogeneous, hypoechoic with well-defined margins, and Grade II vascularity on Color Flow Doppler.  It has no artifacts    No pathologic lymph nodes were seen.      Assessment: Right thyroid nodule is stable in size and appearance  Follow-up ultrasound in 12 months      Assessment  Problems Addressed this Visit          Other    Hyperthyroidism - Primary    Right thyroid nodule     Diagnoses         Codes Comments    Hyperthyroidism    -  Primary ICD-10-CM: E05.90  ICD-9-CM: 242.90     Right thyroid nodule     ICD-10-CM: E04.1  ICD-9-CM: 241.0         Plan  Cont PTU 25 mg daily and repeat labs today.  I will d/c medication after the blood tests.   Reviewed recent labs - start B12 supplements. Consider adding Vit D.      Ultrasound showed 1 cm solid nodule in the right lobe inferiorly. It has benign u/s characteristics and appears stable compared to previous evaluation.  I will continue to follow-up with yearly ultrasounds      Follow-up in 6 months

## 2023-09-27 LAB — 25(OH)D3 SERPL-MCNC: 31.3 NG/ML (ref 30–100)

## 2023-10-02 LAB — ANDROSTANOLONE SERPL-MCNC: 20 NG/DL

## 2024-04-11 ENCOUNTER — OFFICE VISIT (OUTPATIENT)
Dept: ENDOCRINOLOGY | Facility: CLINIC | Age: 39
End: 2024-04-11
Payer: COMMERCIAL

## 2024-04-11 VITALS
DIASTOLIC BLOOD PRESSURE: 60 MMHG | SYSTOLIC BLOOD PRESSURE: 118 MMHG | WEIGHT: 168 LBS | HEIGHT: 68 IN | BODY MASS INDEX: 25.46 KG/M2 | HEART RATE: 80 BPM

## 2024-04-11 DIAGNOSIS — E55.9 VITAMIN D DEFICIENCY: ICD-10-CM

## 2024-04-11 DIAGNOSIS — E04.1 RIGHT THYROID NODULE: ICD-10-CM

## 2024-04-11 DIAGNOSIS — E05.90 HYPERTHYROIDISM: Primary | ICD-10-CM

## 2024-04-11 LAB
ALBUMIN SERPL-MCNC: 4.2 G/DL (ref 3.5–5.2)
ALBUMIN/GLOB SERPL: 1.4 G/DL
ALP SERPL-CCNC: 125 U/L (ref 39–117)
ALT SERPL W P-5'-P-CCNC: 33 U/L (ref 1–33)
ANION GAP SERPL CALCULATED.3IONS-SCNC: 10.9 MMOL/L (ref 5–15)
AST SERPL-CCNC: 34 U/L (ref 1–32)
BILIRUB SERPL-MCNC: 0.3 MG/DL (ref 0–1.2)
BUN SERPL-MCNC: 10 MG/DL (ref 6–20)
BUN/CREAT SERPL: 11.1 (ref 7–25)
CALCIUM SPEC-SCNC: 9.4 MG/DL (ref 8.6–10.5)
CHLORIDE SERPL-SCNC: 107 MMOL/L (ref 98–107)
CO2 SERPL-SCNC: 24.1 MMOL/L (ref 22–29)
CREAT SERPL-MCNC: 0.9 MG/DL (ref 0.57–1)
EGFRCR SERPLBLD CKD-EPI 2021: 83.6 ML/MIN/1.73
GLOBULIN UR ELPH-MCNC: 3.1 GM/DL
GLUCOSE SERPL-MCNC: 77 MG/DL (ref 65–99)
POTASSIUM SERPL-SCNC: 4.2 MMOL/L (ref 3.5–5.2)
PROT SERPL-MCNC: 7.3 G/DL (ref 6–8.5)
SODIUM SERPL-SCNC: 142 MMOL/L (ref 136–145)
T4 FREE SERPL-MCNC: 1.22 NG/DL (ref 0.93–1.7)
TSH SERPL DL<=0.05 MIU/L-ACNC: 1.12 UIU/ML (ref 0.27–4.2)

## 2024-04-11 PROCEDURE — 82306 VITAMIN D 25 HYDROXY: CPT | Performed by: INTERNAL MEDICINE

## 2024-04-11 PROCEDURE — 84443 ASSAY THYROID STIM HORMONE: CPT | Performed by: INTERNAL MEDICINE

## 2024-04-11 PROCEDURE — 82607 VITAMIN B-12: CPT | Performed by: INTERNAL MEDICINE

## 2024-04-11 PROCEDURE — 84439 ASSAY OF FREE THYROXINE: CPT | Performed by: INTERNAL MEDICINE

## 2024-04-11 PROCEDURE — 80053 COMPREHEN METABOLIC PANEL: CPT | Performed by: INTERNAL MEDICINE

## 2024-04-11 NOTE — PROGRESS NOTES
"Chief complaint  Hyperthyroidism    Subjective   Jazlyn Mota is a 38 y.o. female is here today for follow-up.  Hyperthyroidism: on propylthiouracil since Oct 2015.   July 2017 patient had reaction to PTU - itching and rash.  Then restarted later with no problems.   We have slowly decreased PTU dose and discontinued PTU 3/21.     Later on she developed symptoms of hyperthyroidism and restarted PTU at 1/2 tab daily. TFT were normal but she remained on the medication. We have d/c the medication 09/2023.     Patient c/o pain behind the eye, occasional palpitations. She thinks that thyroid is off. She also has some discomfort in the lower neck      Medications    Current Outpatient Medications:     busPIRone (BUSPAR) 5 MG tablet, Take 1 tablet by mouth 2 (Two) Times a Day., Disp: , Rfl:       Review of systems  Review of Systems   Constitutional:  Positive for fatigue.   Eyes:  Positive for pain.   Cardiovascular:  Positive for palpitations.   Neurological:  Positive for headache.   Psychiatric/Behavioral:  The patient is nervous/anxious.          Physical exam  Objective   Vitals:    04/11/24 1402   BP: 118/60   Pulse: 80   Weight: 76.2 kg (168 lb)   Height: 172.7 cm (67.99\")   Body mass index is 25.55 kg/m².     Physical Exam   Constitutional: She is oriented to person, place, and time. She appears well-developed.   HENT:   Head: Normocephalic and atraumatic.   Neck: Thyromegaly (priminent right lobe) present.   Cardiovascular: Normal rate, regular rhythm, normal heart sounds and normal pulses.   Pulmonary/Chest: Effort normal and breath sounds normal.   Neurological: She is alert and oriented to person, place, and time.   Skin: Skin is warm and dry. No rash noted.   Psychiatric: Mood and thought content normal.   Vitals reviewed.          LABS AND IMAGING  No visits with results within 1 Month(s) from this visit.   Latest known visit with results is:   Office Visit on 09/26/2023   Component Date Value Ref Range " Status    TSH 09/26/2023 1.130  0.270 - 4.200 uIU/mL Final    Free T4 09/26/2023 1.11  0.93 - 1.70 ng/dL Final    25 Hydroxy, Vitamin D 09/26/2023 31.3  30.0 - 100.0 ng/ml Final    Glucose 09/26/2023 79  65 - 99 mg/dL Final    BUN 09/26/2023 9  6 - 20 mg/dL Final    Creatinine 09/26/2023 0.73  0.57 - 1.00 mg/dL Final    Sodium 09/26/2023 140  136 - 145 mmol/L Final    Potassium 09/26/2023 4.2  3.5 - 5.2 mmol/L Final    Chloride 09/26/2023 106  98 - 107 mmol/L Final    CO2 09/26/2023 24.9  22.0 - 29.0 mmol/L Final    Calcium 09/26/2023 9.2  8.6 - 10.5 mg/dL Final    Total Protein 09/26/2023 7.3  6.0 - 8.5 g/dL Final    Albumin 09/26/2023 4.2  3.5 - 5.2 g/dL Final    ALT (SGPT) 09/26/2023 31  1 - 33 U/L Final    AST (SGOT) 09/26/2023 32  1 - 32 U/L Final    Alkaline Phosphatase 09/26/2023 122 (H)  39 - 117 U/L Final    Total Bilirubin 09/26/2023 0.4  0.0 - 1.2 mg/dL Final    Globulin 09/26/2023 3.1  gm/dL Final    A/G Ratio 09/26/2023 1.4  g/dL Final    BUN/Creatinine Ratio 09/26/2023 12.3  7.0 - 25.0 Final    Anion Gap 09/26/2023 9.1  5.0 - 15.0 mmol/L Final    eGFR 09/26/2023 107.4  >60.0 mL/min/1.73 Final    Dihydrotestosterone 09/26/2023 20  ng/dL Final    This test was developed and its performance characteristics  determined by [facility]. It has not been cleared or approved  by the Food and Drug Administration.  Reference Range:  Adult Female: 4 - 22     Thyroid ultrasound 04/11/24         Real time high resolution imaging of the thyroid gland was performed in transverse and longitudinal planes.   Previous images were reviewed and compared to the current appearance to assess stability.       The right lobe measured 5.28 cm L x 2.46 cm AP x 2.92 cm in TV dimension.    The isthmus measured 0.67 cm in thickness.    The left thyroid lobe measured 5.04 cm L x 2.11 cm AP x 2.4 cm in TV dimension.    Thyroid gland is prominent and homogeneous and contains single nodule in the right lobe.    Nodule 1   is located in the  right lower lobe and measures 1.21 x 0.95 x 1.15 cm (L X AP X TV).  This nodule is solid, homogeneous, hypoechoic with well-defined margins, and Grade II vascularity on Color Flow Doppler.  It has no artifacts    No pathologic lymph nodes were seen.      Assessment: Right thyroid nodule is stable in size and appearance  Follow-up ultrasound in 12 months      Assessment  Problems Addressed this Visit          Other    Hyperthyroidism - Primary    Relevant Orders    Comprehensive Metabolic Panel    Vitamin B12    US Thyroid (Completed)    Vitamin D deficiency    Relevant Orders    Vitamin D,25-Hydroxy    Right thyroid nodule    Relevant Orders    TSH    T4, Free     Diagnoses         Codes Comments    Hyperthyroidism    -  Primary ICD-10-CM: E05.90  ICD-9-CM: 242.90     Vitamin D deficiency     ICD-10-CM: E55.9  ICD-9-CM: 268.9     Right thyroid nodule     ICD-10-CM: E04.1  ICD-9-CM: 241.0         Plan  Repeat thyroid function test and I have added B-12 and Vit D. Advised to start allergy pill as the headache could be related to the allergies   Ultrasound showed 1.2 cm solid nodule in the right lobe inferiorly. It has benign u/s characteristics and appears stable compared to previous evaluation.  I will continue to follow-up with yearly ultrasounds      Follow-up in 6 months

## 2024-04-12 LAB
25(OH)D3 SERPL-MCNC: 26.3 NG/ML (ref 30–100)
VIT B12 BLD-MCNC: 377 PG/ML (ref 211–946)

## 2024-04-15 RX ORDER — ERGOCALCIFEROL 1.25 MG/1
50000 CAPSULE ORAL WEEKLY
Qty: 5 CAPSULE | Refills: 5 | Status: SHIPPED | OUTPATIENT
Start: 2024-04-15

## 2024-09-19 ENCOUNTER — TRANSCRIBE ORDERS (OUTPATIENT)
Dept: ADMINISTRATIVE | Facility: HOSPITAL | Age: 39
End: 2024-09-19
Payer: COMMERCIAL

## 2024-09-19 DIAGNOSIS — Z12.31 SCREENING MAMMOGRAM FOR HIGH-RISK PATIENT: Primary | ICD-10-CM

## 2025-02-13 ENCOUNTER — OFFICE VISIT (OUTPATIENT)
Dept: ENDOCRINOLOGY | Facility: CLINIC | Age: 40
End: 2025-02-13
Payer: COMMERCIAL

## 2025-02-13 VITALS
SYSTOLIC BLOOD PRESSURE: 116 MMHG | OXYGEN SATURATION: 99 % | HEART RATE: 79 BPM | WEIGHT: 162.5 LBS | HEIGHT: 68 IN | BODY MASS INDEX: 24.63 KG/M2 | DIASTOLIC BLOOD PRESSURE: 60 MMHG

## 2025-02-13 DIAGNOSIS — E04.1 RIGHT THYROID NODULE: ICD-10-CM

## 2025-02-13 DIAGNOSIS — E55.9 VITAMIN D DEFICIENCY: ICD-10-CM

## 2025-02-13 DIAGNOSIS — E05.90 HYPERTHYROIDISM: Primary | ICD-10-CM

## 2025-02-13 LAB
25(OH)D3 SERPL-MCNC: 48.5 NG/ML (ref 30–100)
VIT B12 BLD-MCNC: 793 PG/ML (ref 211–946)

## 2025-02-13 PROCEDURE — 84439 ASSAY OF FREE THYROXINE: CPT | Performed by: INTERNAL MEDICINE

## 2025-02-13 PROCEDURE — 84443 ASSAY THYROID STIM HORMONE: CPT | Performed by: INTERNAL MEDICINE

## 2025-02-13 PROCEDURE — 82306 VITAMIN D 25 HYDROXY: CPT | Performed by: INTERNAL MEDICINE

## 2025-02-13 PROCEDURE — 80048 BASIC METABOLIC PNL TOTAL CA: CPT | Performed by: INTERNAL MEDICINE

## 2025-02-13 PROCEDURE — 82607 VITAMIN B-12: CPT | Performed by: INTERNAL MEDICINE

## 2025-02-13 NOTE — PROGRESS NOTES
"Chief complaint  Hyperthyroidism    Subjective   Jazlyn Mota is a 39 y.o. female is here today for follow-up.  Hyperthyroidism: on propylthiouracil since Oct 2015.   July 2017 patient had reaction to PTU - itching and rash.  Then restarted later with no problems.   We have slowly decreased PTU dose and discontinued PTU 09/2023     Thyroid levels remained stable since then   Thyroid nodule in the right lobe is 1 cm in size, stable on u/s evaluation.     Patient c/o headaches, breakouts.     Medications    Current Outpatient Medications:     busPIRone (BUSPAR) 5 MG tablet, Take 1 tablet by mouth 2 (Two) Times a Day., Disp: , Rfl:     Cyanocobalamin 1000 MCG sublingual tablet, Place 1 tablet under the tongue Daily., Disp: 90 each, Rfl: 1    vitamin D (ERGOCALCIFEROL) 1.25 MG (59629 UT) capsule capsule, Take 1 capsule by mouth 1 (One) Time Per Week., Disp: 5 capsule, Rfl: 5      Review of systems  Review of Systems   Constitutional:  Positive for fatigue.   Skin:         acne   Neurological:  Positive for headache.   Psychiatric/Behavioral:  The patient is nervous/anxious.          Physical exam  Objective   Vitals:    02/13/25 1057   BP: 116/60   BP Location: Right arm   Patient Position: Sitting   Cuff Size: Adult   Pulse: 79   SpO2: 99%   Weight: 73.7 kg (162 lb 8 oz)   Height: 172.7 cm (67.99\")   Body mass index is 24.72 kg/m².     Physical Exam   Constitutional: She is oriented to person, place, and time. She appears well-developed.   HENT:   Head: Normocephalic and atraumatic.   Neck: Thyromegaly (priminent right lobe) present.   Cardiovascular: Normal rate, regular rhythm, normal heart sounds and normal pulses.   Pulmonary/Chest: Effort normal and breath sounds normal.   Neurological: She is alert and oriented to person, place, and time.   Skin: Skin is warm and dry.   Psychiatric: Mood and thought content normal.   Vitals reviewed.          LABS AND IMAGING  No visits with results within 1 Month(s) from this " visit.   Latest known visit with results is:   Office Visit on 04/11/2024   Component Date Value Ref Range Status    Glucose 04/11/2024 77  65 - 99 mg/dL Final    BUN 04/11/2024 10  6 - 20 mg/dL Final    Creatinine 04/11/2024 0.90  0.57 - 1.00 mg/dL Final    Sodium 04/11/2024 142  136 - 145 mmol/L Final    Potassium 04/11/2024 4.2  3.5 - 5.2 mmol/L Final    Chloride 04/11/2024 107  98 - 107 mmol/L Final    CO2 04/11/2024 24.1  22.0 - 29.0 mmol/L Final    Calcium 04/11/2024 9.4  8.6 - 10.5 mg/dL Final    Total Protein 04/11/2024 7.3  6.0 - 8.5 g/dL Final    Albumin 04/11/2024 4.2  3.5 - 5.2 g/dL Final    ALT (SGPT) 04/11/2024 33  1 - 33 U/L Final    AST (SGOT) 04/11/2024 34 (H)  1 - 32 U/L Final    Alkaline Phosphatase 04/11/2024 125 (H)  39 - 117 U/L Final    Total Bilirubin 04/11/2024 0.3  0.0 - 1.2 mg/dL Final    Globulin 04/11/2024 3.1  gm/dL Final    A/G Ratio 04/11/2024 1.4  g/dL Final    BUN/Creatinine Ratio 04/11/2024 11.1  7.0 - 25.0 Final    Anion Gap 04/11/2024 10.9  5.0 - 15.0 mmol/L Final    eGFR 04/11/2024 83.6  >60.0 mL/min/1.73 Final    TSH 04/11/2024 1.120  0.270 - 4.200 uIU/mL Final    Free T4 04/11/2024 1.22  0.93 - 1.70 ng/dL Final    25 Hydroxy, Vitamin D 04/11/2024 26.3 (L)  30.0 - 100.0 ng/ml Final    Vitamin B-12 04/11/2024 377  211 - 946 pg/mL Final     Thyroid ultrasound 02/13/25          Real time high resolution imaging of the thyroid gland was performed in transverse and longitudinal planes.   Previous images were reviewed and compared to the current appearance to assess stability.       The right lobe measured 5.96 cm L x 2.26 cm AP x 2.88 cm in TV dimension.    The isthmus measured 0.44 cm in thickness.    The left thyroid lobe measured 5.55 cm L x 2.11 cm AP x 2.15 cm in TV dimension.    Thyroid gland is prominent and homogeneous and contains single nodule in the right lobe.    Nodule 1   is located in the right lower lobe and measures 1.11 x 1.01 x 1.15 cm (L X AP X TV).  This nodule  is solid, homogeneous, hypoechoic with well-defined margins, and Grade II vascularity on Color Flow Doppler.  It has no artifacts    No pathologic lymph nodes were seen.      Assessment: Right thyroid nodule is stable in size and appearance  Follow-up ultrasound in 12 -24 months      Assessment  Problems Addressed this Visit          Endocrine and Metabolic    Hyperthyroidism - Primary    Relevant Orders    US Thyroid (Completed)    T4, Free    TSH    Basic Metabolic Panel    Vitamin D,25-Hydroxy    Vitamin B12    Vitamin D deficiency    Right thyroid nodule    Relevant Orders    US Thyroid (Completed)    T4, Free    TSH    Basic Metabolic Panel    Vitamin D,25-Hydroxy    Vitamin B12     Diagnoses         Codes Comments    Hyperthyroidism    -  Primary ICD-10-CM: E05.90  ICD-9-CM: 242.90     Right thyroid nodule     ICD-10-CM: E04.1  ICD-9-CM: 241.0     Vitamin D deficiency     ICD-10-CM: E55.9  ICD-9-CM: 268.9         Plan  Repeat thyroid function test and I have added B-12 and Vit D. She has started supplements last visit.    Ultrasound showed 1.11 cm solid nodule in the right lobe inferiorly. It has benign u/s characteristics and appears stable compared to previous evaluation.  I will continue to follow-up with u/s every 1-2 years      Follow-up in 12 months

## 2025-02-14 LAB
ANION GAP SERPL CALCULATED.3IONS-SCNC: 8.8 MMOL/L (ref 5–15)
BUN SERPL-MCNC: 10 MG/DL (ref 6–20)
BUN/CREAT SERPL: 12.2 (ref 7–25)
CALCIUM SPEC-SCNC: 9.5 MG/DL (ref 8.6–10.5)
CHLORIDE SERPL-SCNC: 105 MMOL/L (ref 98–107)
CO2 SERPL-SCNC: 24.2 MMOL/L (ref 22–29)
CREAT SERPL-MCNC: 0.82 MG/DL (ref 0.57–1)
EGFRCR SERPLBLD CKD-EPI 2021: 92.9 ML/MIN/1.73
GLUCOSE SERPL-MCNC: 73 MG/DL (ref 65–99)
POTASSIUM SERPL-SCNC: 3.7 MMOL/L (ref 3.5–5.2)
SODIUM SERPL-SCNC: 138 MMOL/L (ref 136–145)
T4 FREE SERPL-MCNC: 1.81 NG/DL (ref 0.92–1.68)
TSH SERPL DL<=0.05 MIU/L-ACNC: <0.005 UIU/ML (ref 0.27–4.2)

## 2025-02-16 DIAGNOSIS — E05.90 HYPERTHYROIDISM: Primary | ICD-10-CM

## 2025-02-16 RX ORDER — PROPYLTHIOURACIL 50 MG/1
50 TABLET ORAL 2 TIMES DAILY
Qty: 60 TABLET | Refills: 11 | Status: SHIPPED | OUTPATIENT
Start: 2025-02-16